# Patient Record
Sex: MALE | Race: WHITE | Employment: FULL TIME | URBAN - METROPOLITAN AREA
[De-identification: names, ages, dates, MRNs, and addresses within clinical notes are randomized per-mention and may not be internally consistent; named-entity substitution may affect disease eponyms.]

---

## 2020-11-06 ENCOUNTER — HOSPITAL ENCOUNTER (EMERGENCY)
Age: 45
Discharge: HOME | End: 2020-11-06
Attending: EMERGENCY MEDICINE

## 2020-11-06 VITALS
HEART RATE: 89 BPM | RESPIRATION RATE: 18 BRPM | HEIGHT: 61 IN | SYSTOLIC BLOOD PRESSURE: 170 MMHG | TEMPERATURE: 99 F | OXYGEN SATURATION: 99 % | BODY MASS INDEX: 46.29 KG/M2 | DIASTOLIC BLOOD PRESSURE: 116 MMHG

## 2020-11-06 LAB
BILIRUBIN URINE: ABNORMAL
BLOOD, URINE: NEGATIVE
CLARITY: CLEAR
COLOR: YELLOW
GLUCOSE URINE: NEGATIVE MG/DL
KETONES, URINE: 15 MG/DL
LEUKOCYTE ESTERASE, URINE: NEGATIVE
MICROSCOPIC EXAMINATION: ABNORMAL
NITRITE, URINE: NEGATIVE
PH UA: 6 (ref 5–8)
PROTEIN UA: NEGATIVE MG/DL
SPECIFIC GRAVITY UA: >=1.03 (ref 1–1.03)
URINE REFLEX TO CULTURE: ABNORMAL
URINE TYPE: ABNORMAL
UROBILINOGEN, URINE: 1 E.U./DL

## 2020-11-06 PROCEDURE — 87591 N.GONORRHOEAE DNA AMP PROB: CPT

## 2020-11-06 PROCEDURE — 99283 EMERGENCY DEPT VISIT LOW MDM: CPT

## 2020-11-06 PROCEDURE — 96372 THER/PROPH/DIAG INJ SC/IM: CPT

## 2020-11-06 PROCEDURE — 87491 CHLMYD TRACH DNA AMP PROBE: CPT

## 2020-11-06 PROCEDURE — 6360000002 HC RX W HCPCS: Performed by: EMERGENCY MEDICINE

## 2020-11-06 PROCEDURE — 6370000000 HC RX 637 (ALT 250 FOR IP): Performed by: EMERGENCY MEDICINE

## 2020-11-06 PROCEDURE — 81003 URINALYSIS AUTO W/O SCOPE: CPT

## 2020-11-06 RX ORDER — CEFTRIAXONE SODIUM 250 MG/1
250 INJECTION, POWDER, FOR SOLUTION INTRAMUSCULAR; INTRAVENOUS ONCE
Status: COMPLETED | OUTPATIENT
Start: 2020-11-06 | End: 2020-11-06

## 2020-11-06 RX ORDER — LEVOFLOXACIN 500 MG/1
500 TABLET, FILM COATED ORAL DAILY
Qty: 10 TABLET | Refills: 0 | Status: SHIPPED | OUTPATIENT
Start: 2020-11-06 | End: 2020-11-16

## 2020-11-06 RX ORDER — AZITHROMYCIN 500 MG/1
1000 TABLET, FILM COATED ORAL ONCE
Status: COMPLETED | OUTPATIENT
Start: 2020-11-06 | End: 2020-11-06

## 2020-11-06 RX ADMIN — CEFTRIAXONE SODIUM 250 MG: 250 INJECTION, POWDER, FOR SOLUTION INTRAMUSCULAR; INTRAVENOUS at 20:43

## 2020-11-06 RX ADMIN — AZITHROMYCIN 1000 MG: 500 TABLET, FILM COATED ORAL at 20:42

## 2020-11-06 ASSESSMENT — PAIN DESCRIPTION - LOCATION: LOCATION: PENIS

## 2020-11-06 ASSESSMENT — PAIN DESCRIPTION - ORIENTATION: ORIENTATION: LEFT

## 2020-11-06 ASSESSMENT — ENCOUNTER SYMPTOMS
PHOTOPHOBIA: 0
WHEEZING: 0
RHINORRHEA: 0
NAUSEA: 0
BACK PAIN: 0
SHORTNESS OF BREATH: 0
VOMITING: 0
COLOR CHANGE: 0

## 2020-11-06 ASSESSMENT — PAIN DESCRIPTION - ONSET: ONSET: ON-GOING

## 2020-11-06 ASSESSMENT — PAIN DESCRIPTION - FREQUENCY: FREQUENCY: CONTINUOUS

## 2020-11-06 ASSESSMENT — PAIN DESCRIPTION - PAIN TYPE: TYPE: ACUTE PAIN

## 2020-11-06 ASSESSMENT — PAIN SCALES - GENERAL: PAINLEVEL_OUTOF10: 6

## 2020-11-07 NOTE — ED NOTES
ED MD in to eval. This RN at bedside. Left testicle very tender to palpation. Some swelling noted.      Martínez Calzada RN  11/06/20 2037

## 2020-11-07 NOTE — ED PROVIDER NOTES
[address] [facility]  EMERGENCY DEPARTMENTENCOUNTER      Pt Name: Alaina Mcduffie  MRN: [id number]  [place] 1975  Date ofevaluation: 11/6/2020  Provider: Darnell Bray MD    CHIEF COMPLAINT       Chief Complaint   Patient presents with    Testicle Pain         HISTORY OF PRESENT ILLNESS   (Location/Symptom, Timing/Onset,Context/Setting, Quality, Duration, Modifying Factors, Severity)  Note limiting factors. Alaina Mcduffie is a 41 y.o. male  who  has a past medical history of Kidney stone. who presents to the emergency department for evaluation of left statistically pain. Patient reports an atraumatic acute onset of left testicular pain that began earlier in the day. He states that has been persistent throughout the course the day and is worse with movement and positioning. He states he did has tenderness to palpation to the posterior aspect and is concerned because he feels an area of swelling. He does describe some dysuria and penile discharge. He states he is not currently sexually active. He was in our medical history up with a partner and did not use barrier contraceptives, but this is been over a month in the past.  He denies abdominal pain fevers or vomiting. Denies changes in bowel function. He has not taken medications for symptoms. HPI    NursingNotes were reviewed. REVIEW OF SYSTEMS    (2-9 systems for level 4, 10 or more for level 5)     Review of Systems   Constitutional: Negative for activity change, chills, fatigue and fever. HENT: Negative for congestion and rhinorrhea. Eyes: Negative for photophobia and visual disturbance. Respiratory: Negative for shortness of breath and wheezing. Cardiovascular: Negative for palpitations and leg swelling. Gastrointestinal: Negative for nausea and vomiting. Endocrine: Negative for polydipsia and polyuria. Genitourinary: Positive for discharge, dysuria, scrotal swelling and testicular pain.  Negative for difficulty urinating, frequency, genital sores, hematuria and penile swelling. Musculoskeletal: Negative for back pain, gait problem, neck pain and neck stiffness. Skin: Negative for color change, pallor, rash and wound. Neurological: Negative for light-headedness and headaches. Psychiatric/Behavioral: Negative for confusion. The patient is not nervous/anxious. All other systems reviewed and are negative. Except as noted above the remainder of the review of systems was reviewed and negative. PAST MEDICAL HISTORY     Past Medical History:   Diagnosis Date    Kidney stone          SURGICALHISTORY       Past Surgical History:   Procedure Laterality Date    MANDIBLE FRACTURE SURGERY Left     WISDOM TOOTH EXTRACTION           CURRENT MEDICATIONS       Discharge Medication List as of 11/6/2020  8:51 PM      CONTINUE these medications which have NOT CHANGED    Details   ibuprofen (ADVIL) 200 MG tablet Take 2 tablets by mouth every 6 hours as needed for Pain for up to 8 doses. , Disp-24 tablet, R-0                  Patient has no known allergies. FAMILY HISTORY     History reviewed. No pertinent family history.        SOCIAL HISTORY       Social History     Socioeconomic History    Marital status: Single     Spouse name: None    Number of children: None    Years of education: None    Highest education level: None   Occupational History    None   Social Needs    Financial resource strain: None    Food insecurity     Worry: None     Inability: None    Transportation needs     Medical: None     Non-medical: None   Tobacco Use    Smoking status: Current Every Day Smoker     Packs/day: 1.00     Types: Cigarettes    Smokeless tobacco: Current User   Substance and Sexual Activity    Alcohol use: No     Alcohol/week: 0.0 standard drinks    Drug use: No     Types: IV     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    Sexual activity: Yes     Partners: Female   Lifestyle    Physical activity     Days per week: None     Minutes per session: None    Stress: None   Relationships    Social connections     Talks on phone: None     Gets together: None     Attends Caodaism service: None     Active member of club or organization: None     Attends meetings of clubs or organizations: None     Relationship status: None    Intimate partner violence     Fear of current or ex partner: None     Emotionally abused: None     Physically abused: None     Forced sexual activity: None   Other Topics Concern    None   Social History Narrative    None       SCREENINGS             PHYSICAL EXAM    (up to 7 for level 4, 8 or more for level 5)     ED Triage Vitals [11/06/20 2016]   BP Temp Temp Source Pulse Resp SpO2 Height Weight   (!) 170/116 99 °F (37.2 °C) Oral 89 18 99 % 5' 0.5\" (1.537 m) --       Physical Exam  Vitals signs and nursing note reviewed. Constitutional:       General: He is not in acute distress. Appearance: He is well-developed. HENT:      Head: Normocephalic and atraumatic. Eyes:      Conjunctiva/sclera: Conjunctivae normal.   Neck:      Musculoskeletal: Normal range of motion. Trachea: No tracheal deviation. Cardiovascular:      Rate and Rhythm: Normal rate and regular rhythm. Pulmonary:      Effort: Pulmonary effort is normal.      Breath sounds: Normal breath sounds. No wheezing or rales. Abdominal:      General: There is no distension. Palpations: Abdomen is soft. Tenderness: There is no abdominal tenderness. Hernia: There is no hernia in the left inguinal area or right inguinal area. Genitourinary:     Penis: Normal and circumcised. Scrotum/Testes: Cremasteric reflex is present. Left: Tenderness present. Testicular hydrocele or varicocele not present. Left testis is descended. Epididymis:      Left: Tenderness present. Musculoskeletal: Normal range of motion. General: No deformity.    Lymphadenopathy:      Lower Body: No right inguinal adenopathy. No left inguinal adenopathy. Skin:     General: Skin is warm and dry. Neurological:      Mental Status: He is alert and oriented to person, place, and time. RESULTS     EKG: All EKG's are interpreted by the Emergency Department Physician who either signs or Co-signsthis chart in the absence of a cardiologist.      RADIOLOGY:   Non-plain filmimages such as CT, Ultrasound and MRI are read by the radiologist. Plain radiographic images are visualized and preliminarily interpreted by the emergency physician with the below findings:        Interpretation per the Radiologist below, if available at the time ofthis note:    No orders to display         ED BEDSIDE ULTRASOUND:   Performed by ED Physician - none    LABS:  Labs Reviewed   URINE RT REFLEX TO CULTURE - Abnormal; Notable for the following components:       Result Value    Bilirubin Urine SMALL (*)     Ketones, Urine 15 (*)     All other components within normal limits    Narrative:     Performed at:  [address]   Phone [phone]   C.TRACHOMATIS N.GONORRHOEAE DNA, URINE       All other labs were within normal range or not returned as of this dictation. EMERGENCY DEPARTMENT COURSE and DIFFERENTIAL DIAGNOSIS/MDM:   Vitals:    Vitals:    11/06/20 2016   BP: (!) 170/116   Pulse: 89   Resp: 18   Temp: 99 °F (37.2 °C)   TempSrc: Oral   SpO2: 99%   Height: 5' 0.5\" (1.537 m)       Patient was given thefollowing medications:  Medications   azithromycin (ZITHROMAX) tablet 1,000 mg (1,000 mg Oral Given 11/6/20 2042)   cefTRIAXone (ROCEPHIN) injection 250 mg (250 mg Intramuscular Given 11/6/20 2043)       ED COURSE & MEDICAL DECISION MAKING    Pertinent Labs & Imaging studies reviewed. (See chart for details)   -  Patient seen and evaluated in the emergency department. -  Triage and nursing notes reviewed and incorporated.   -  Old chart records reviewed and incorporated. -  Differential diagnosis includes: Differential diagnosis: Testicular torsion, epididymitis, orchitis, epididymo-orchitis, prostatitis, scrotal abscess, scrotal cellulitis, hernia, kidney stone, STD, traumatic testicle/fracures testes, other    -  Work-up included:  See above  -  ED treatment included: See above  -  Results discussed with patient. Labs show unremarkable urinalysis. Patient has cremaster reflexes intact. Testicles not high riding. No improvement of the pain with elevation of the testicle but does have tenderness over the epididymis. I suspect the patient does have epididymitis given the presentation wants rule out testicular torsion. I did speak to the physician at [facility] [place] to inform of the patient would be sent for ultrasound. I was informed by nursing staff that the patient was delivering about having ultrasound performed this evening. He was provided with urology referral as well as a course of Levaquin and was treated empirically for STD. It was stressed to the patient that he does need to follow-up and should have an ultrasound to rule out testicular torsion. Patient feels improved on reevaluation. Symptomatic treatment with expectant management discussed with the patient and the amenable to treatment plan and outpatient follow-up. Strict return precautions were discussed with the patient. They demonstrated understanding of when to return to the emergency department for new or worsening symptoms. .  The patient is agreeable with plan of care and disposition. REASSESSMENT          CRITICAL CARE TIME   Total Critical Care time was 0 minutes, excluding separatelyreportable procedures. There was a high probability ofclinically significant/life threatening deterioration in the patient's condition which required my urgent intervention. CONSULTS:  None    PROCEDURES:  Unless otherwise noted below, none     Procedures    FINAL IMPRESSION      1.  Pain in left testicle          DISPOSITION/PLAN   DISPOSITION Decision To Discharge 11/06/2020 08:47:36 PM      PATIENT REFERREDTO:  [facility]) Pre-Services  [phone]  Schedule an appointment as soon as possible for a visit   As needed    Rafael Schultz MD  [address]  [phone]    Schedule an appointment as soon as possible for a visit   As needed      DISCHARGEMEDICATIONS:  Discharge Medication List as of 11/6/2020  8:51 PM      START taking these medications    Details   levoFLOXacin (LEVAQUIN) 500 MG tablet Take 1 tablet by mouth daily for 10 days, Disp-10 tablet,R-0Print                (Please note that portions of this note were completed with a voice recognition program.  Efforts were made to edit the dictations but occasionally words are mis-transcribed.)    Noe Vieira MD (electronically signed)  Attending Emergency Physician          Noe Vieira MD  11/06/20 9109

## 2020-11-07 NOTE — ED NOTES
Strongly encouraged to the patient that he needs to go to [facility] to be evaluated immediately for testicular torsion. Patient states that it has been a long week and he would rather just go home tonight and go to [facility] in the morning to get the ultrasound. MD made aware. Despite medical recommendations, patient states he is certain that he does not have a testicular torsion or an STD.      Oral Reid RN  11/06/20 8355

## 2020-11-07 NOTE — ED NOTES
States left sided testicle pain, especially when he does self-examinations for past 2 days. Looked up on internet and diagnosed self with epididymitis. States has been withdrawing from [employer]. Last dose was last week.      Conrado Murillo RN  11/06/20 2023

## 2020-11-08 ENCOUNTER — APPOINTMENT (OUTPATIENT)
Dept: ULTRASOUND IMAGING | Age: 45
End: 2020-11-08

## 2020-11-08 ENCOUNTER — HOSPITAL ENCOUNTER (EMERGENCY)
Age: 45
Discharge: HOME | End: 2020-11-08
Attending: EMERGENCY MEDICINE

## 2020-11-08 VITALS
DIASTOLIC BLOOD PRESSURE: 80 MMHG | OXYGEN SATURATION: 100 % | TEMPERATURE: 99.3 F | SYSTOLIC BLOOD PRESSURE: 136 MMHG | HEART RATE: 90 BPM | HEIGHT: 70 IN | BODY MASS INDEX: 33.71 KG/M2 | RESPIRATION RATE: 14 BRPM | WEIGHT: 235.45 LBS

## 2020-11-08 LAB
BILIRUBIN URINE: NEGATIVE
BLOOD, URINE: NEGATIVE
CLARITY: ABNORMAL
COLOR: YELLOW
COMMENT UA: ABNORMAL
GLUCOSE URINE: NEGATIVE MG/DL
KETONES, URINE: 40 MG/DL
LEUKOCYTE ESTERASE, URINE: NEGATIVE
MICROSCOPIC EXAMINATION: YES
MUCUS: ABNORMAL /LPF
NITRITE, URINE: NEGATIVE
PH UA: 5.5 (ref 5–8)
PROTEIN UA: NEGATIVE MG/DL
RBC UA: ABNORMAL /HPF (ref 0–4)
SPECIFIC GRAVITY UA: 1.01 (ref 1–1.03)
URINE REFLEX TO CULTURE: ABNORMAL
URINE TYPE: ABNORMAL
UROBILINOGEN, URINE: 0.2 E.U./DL
WBC UA: ABNORMAL /HPF (ref 0–5)

## 2020-11-08 PROCEDURE — 99283 EMERGENCY DEPT VISIT LOW MDM: CPT

## 2020-11-08 PROCEDURE — 76870 US EXAM SCROTUM: CPT

## 2020-11-08 PROCEDURE — 81001 URINALYSIS AUTO W/SCOPE: CPT

## 2020-11-08 ASSESSMENT — PAIN DESCRIPTION - DESCRIPTORS: DESCRIPTORS: ACHING

## 2020-11-08 ASSESSMENT — PAIN DESCRIPTION - LOCATION
LOCATION: OTHER (COMMENT)
LOCATION: GROIN

## 2020-11-08 ASSESSMENT — ENCOUNTER SYMPTOMS
SHORTNESS OF BREATH: 0
ABDOMINAL PAIN: 0

## 2020-11-08 ASSESSMENT — PAIN DESCRIPTION - ORIENTATION
ORIENTATION: LEFT
ORIENTATION: LEFT

## 2020-11-08 ASSESSMENT — PAIN DESCRIPTION - PAIN TYPE
TYPE: ACUTE PAIN
TYPE: ACUTE PAIN

## 2020-11-08 ASSESSMENT — PAIN DESCRIPTION - FREQUENCY: FREQUENCY: CONTINUOUS

## 2020-11-08 ASSESSMENT — PAIN SCALES - GENERAL
PAINLEVEL_OUTOF10: 4
PAINLEVEL_OUTOF10: 3

## 2020-11-08 ASSESSMENT — PAIN - FUNCTIONAL ASSESSMENT: PAIN_FUNCTIONAL_ASSESSMENT: ACTIVITIES ARE NOT PREVENTED

## 2020-11-08 ASSESSMENT — PAIN DESCRIPTION - ONSET: ONSET: ON-GOING

## 2020-11-08 ASSESSMENT — PAIN DESCRIPTION - PROGRESSION: CLINICAL_PROGRESSION: NOT CHANGED

## 2020-11-08 NOTE — ED PROVIDER NOTES
[address] [facility]      Pt Name: Dariel Sanders  MRN: [id number]  [place] 1975  Date of evaluation: 11/8/2020  Provider: Chloe Edward MD    CHIEF COMPLAINT       Chief Complaint   Patient presents with    Groin Swelling     seen friday for lump on testicle. left. need US. HISTORY OF PRESENT ILLNESS   (Location/Symptom, Timing/Onset, Context/Setting, Quality, Duration, Modifying Factors, Severity)  Note limiting factors. Dariel Sanders is a 41 y.o. male who presents to the emergency department with left testicle lump    HPI     Is a 23-year-old  gentleman who was seen a couple days ago for a lump on his left testicle. He was treated for presumptive STD and was actually to be transferred for an ultrasound but he declined it at that time. Currently he presents with actually improving symptoms but still a significant amount of pain which he describes as dull achy localized left testicle minimally worse with movement some relieved by rest with no other aggravating leaving factors. He did have some penile discharge which is resolved at this time. Nursing Notes were reviewed. REVIEW OF SYSTEMS    (2-9 systems for level 4, 10 or more for level 5)     Review of Systems   Constitutional: Negative for chills and fever. Respiratory: Negative for shortness of breath. Cardiovascular: Negative for chest pain. Gastrointestinal: Negative for abdominal pain. Genitourinary: Positive for testicular pain. Negative for penile swelling and scrotal swelling. All other systems reviewed and are negative. Except as noted above the remainder of the review of systems was reviewed and negative.        PAST MEDICAL HISTORY     Past Medical History:   Diagnosis Date    Hypertension     Kidney stone          SURGICAL HISTORY       Past Surgical History:   Procedure Laterality Date    MANDIBLE FRACTURE SURGERY Left     WISDOM TOOTH radiologist. [place] radiographic images are visualized and preliminarily interpreted by the emergency physician with the below findings:        Interpretation per the Radiologist below, if available at the time of this note:    [address]   Final Result   Unremarkable testicular ultrasound with normal Doppler flow. ED BEDSIDE ULTRASOUND:   Performed by ED Physician - none    LABS:  Labs Reviewed   URINE RT REFLEX TO CULTURE - Abnormal; Notable for the following components:       Result Value    Clarity, UA CLOUDY (*)     Ketones, Urine 40 (*)     All other components within normal limits    Narrative:     Performed at:  [address] [employer] [address]   Phone [phone]   MICROSCOPIC URINALYSIS - Abnormal; Notable for the following components:    Mucus, UA Rare (*)     All other components within normal limits    Narrative:     Performed at:  [address] [employer] [address]   Phone [phone]       All other labs were within normal range or not returned as of this dictation. EMERGENCY DEPARTMENT COURSE and DIFFERENTIAL DIAGNOSIS/MDM:   Vitals:    Vitals:    11/08/20 1540 11/08/20 1659   BP: (!) 156/80 (!) 159/86   Pulse: 108 102   Resp: 18 16   Temp: 98.3 °F (36.8 °C) 99.3 °F (37.4 °C)   TempSrc: Temporal Oral   SpO2: 97% 100%   Weight: 235 lb 7.2 oz (106.8 kg)    Height: 5' 10\" (1.778 m)        Above history and physical exam were performed. I reviewed his past medical past surgical social family history. MDM      This is a 55-year-old  gentleman with history of recent STD for which he is being treated and is symptomatically better but presents essentially to get a follow-up ultrasound that he was supposed to get given he still has some testicular pain.   I considered the diagnosis of intermittent torsion however his current scrotal ultrasound is unremarkable. At this point time I believe he is stable for discharge and has been treated for STD. He has urology follow-up in outpatient basis. REASSESSMENT          CRITICAL CARE TIME       CONSULTS:  None    PROCEDURES:  Unless otherwise noted below, none     Procedures        FINAL IMPRESSION      1. Testicular pain, left          DISPOSITION/PLAN   DISPOSITION Decision To Discharge 11/08/2020 06:46:23 PM      PATIENT REFERRED TO:  No follow-up provider specified. DISCHARGE MEDICATIONS:  New Prescriptions    No medications on file     Controlled Substances Monitoring:     No flowsheet data found.     (Please note that portions of this note were completed with a voice recognition program.  Efforts were made to edit the dictations but occasionally words are mis-transcribed.)    Antonio Marcus MD (electronically signed)  Attending Emergency Physician         Antonio Marcus MD  11/08/20 5381

## 2020-11-08 NOTE — ED NOTES
Pt. Called requesting work note, he did not go to [facility] on Friday or Saturday for Ultrasound. Informed pt. We could not get him a work note.      Conrado Marinelli RN  11/08/20 5338

## 2020-11-09 LAB
C. TRACHOMATIS DNA ,URINE: NEGATIVE
N. GONORRHOEAE DNA, URINE: NEGATIVE

## 2020-11-19 ENCOUNTER — HOSPITAL ENCOUNTER (EMERGENCY)
Age: 45
Discharge: HOME | End: 2020-11-19
Attending: EMERGENCY MEDICINE
Payer: COMMERCIAL

## 2020-11-19 VITALS
BODY MASS INDEX: 35.27 KG/M2 | DIASTOLIC BLOOD PRESSURE: 111 MMHG | OXYGEN SATURATION: 99 % | SYSTOLIC BLOOD PRESSURE: 171 MMHG | WEIGHT: 245.81 LBS | TEMPERATURE: 98.5 F | HEART RATE: 98 BPM | RESPIRATION RATE: 16 BRPM

## 2020-11-19 PROCEDURE — 6360000002 HC RX W HCPCS: Performed by: EMERGENCY MEDICINE

## 2020-11-19 PROCEDURE — 12001 RPR S/N/AX/GEN/TRNK 2.5CM/<: CPT

## 2020-11-19 PROCEDURE — 12002 RPR S/N/AX/GEN/TRNK2.6-7.5CM: CPT

## 2020-11-19 PROCEDURE — 99283 EMERGENCY DEPT VISIT LOW MDM: CPT

## 2020-11-19 PROCEDURE — 90471 IMMUNIZATION ADMIN: CPT | Performed by: EMERGENCY MEDICINE

## 2020-11-19 PROCEDURE — 90715 TDAP VACCINE 7 YRS/> IM: CPT | Performed by: EMERGENCY MEDICINE

## 2020-11-19 RX ADMIN — TETANUS TOXOID, REDUCED DIPHTHERIA TOXOID AND ACELLULAR PERTUSSIS VACCINE, ADSORBED 0.5 ML: 5; 2.5; 8; 8; 2.5 SUSPENSION INTRAMUSCULAR at 20:34

## 2020-11-19 ASSESSMENT — PAIN DESCRIPTION - PAIN TYPE: TYPE: ACUTE PAIN

## 2020-11-19 ASSESSMENT — PAIN DESCRIPTION - LOCATION: LOCATION: HEAD

## 2020-11-19 ASSESSMENT — PAIN SCALES - GENERAL: PAINLEVEL_OUTOF10: 4

## 2020-11-20 NOTE — ED NOTES
Wound intact with sutures, cleaned with NSS. Polysporin, dry, sterile dressing applied. Pt tolerated well.       Jeff Pearson RN  11/19/20 2030

## 2020-11-20 NOTE — ED TRIAGE NOTES
Pt to ED with complaint of laceration to top right side of head. States raised up at work and hit his head on a trailer. Denies LOC. Bandaid removed revealing jagged laceration approximately 4 cm in length, bleeding controlled. Wound cleaned with Hibiclins/ NSS. Kitty Ou Pt tolerated without complaint.

## 2020-11-20 NOTE — ED NOTES
Pt states he has had high blood pressure \"for years\", states he has been told to follow up with PMD for his blood pressure.       Stephanie Pedro RN  11/19/20 2040

## 2020-11-20 NOTE — ED PROVIDER NOTES
CHIEF COMPLAINT  Chief Complaint   Patient presents with    Laceration     to top of head, hit head on trailer around [address] [facility] Ramy Judd is a 41 y.o. male who presents to the ED complaining of laceration to the right parietal side of his scalp when he hit his head on the top of the trailer approximately 3 hours ago. Tetanus status is unknown. No loss of consciousness. No neck pain. No visual changes. No epistaxis. No focal neurologic deficits or paresthesias. No amnesia. No vomiting. No vertigo or ataxia. No other complaints, modifying factors or associated symptoms. Nursing notes reviewed. Past Medical History:   Diagnosis Date    Heroin use     Hypertension     Kidney stone      Past Surgical History:   Procedure Laterality Date    MANDIBLE FRACTURE SURGERY Left     WISDOM TOOTH EXTRACTION       History reviewed. No pertinent family history.   Social History     Socioeconomic History    Marital status: Single     Spouse name: Not on file    Number of children: Not on file    Years of education: Not on file    Highest education level: Not on file   Occupational History    Not on file   Social Needs    Financial resource strain: Not on file    Food insecurity     Worry: Not on file     Inability: Not on file    Transportation needs     Medical: Not on file     Non-medical: Not on file   Tobacco Use    Smoking status: Current Every Day Smoker     Packs/day: 1.00     Types: Cigarettes    Smokeless tobacco: Current User   Substance and Sexual Activity    Alcohol use: No     Alcohol/week: 0.0 standard drinks    Drug use: Yes     Types: Marijuana     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    Sexual activity: Yes     Partners: Female   Lifestyle    Physical activity     Days per week: Not on file     Minutes per session: Not on file    Stress: Not on file   Relationships    Social connections     Talks on phone: Not on file     Gets together: Not on file     Attends Muslim service: Not on file     Active member of club or organization: Not on file     Attends meetings of clubs or organizations: Not on file     Relationship status: Not on file    Intimate partner violence     Fear of current or ex partner: Not on file     Emotionally abused: Not on file     Physically abused: Not on file     Forced sexual activity: Not on file   Other Topics Concern    Not on file   Social History Narrative    Not on file     Current Facility-Administered Medications   Medication Dose Route Frequency Provider Last Rate Last Dose    Tetanus-Diphth-Acell Pertussis (BOOSTRIX) injection 0.5 mL  0.5 mL Intramuscular Once Adriano Pleitez MD         Current Outpatient Medications   Medication Sig Dispense Refill    Buprenorphine HCl-Naloxone HCl (SUBOXONE SL) Place under the tongue      ibuprofen (ADVIL) 200 MG tablet Take 2 tablets by mouth every 6 hours as needed for Pain for up to 8 doses. 24 tablet 0     No Known Allergies    REVIEW OF SYSTEMS  Positives and pertinent negatives as per HPI. Six other systems were reviewed and are negative. Nursing notes pertaining to ROS were reviewed. PHYSICAL EXAM   BP (!) 171/111   Pulse 98   Temp 98.5 °F (36.9 °C) (Oral)   Resp 16   Wt 245 lb 13 oz (111.5 kg)   SpO2 99%   BMI 35.27 kg/m²   General: Alert and oriented x 3, NAD. No increased work of breathing or accessory muscle use. Non-ill appearing. Appropriate and interactive  Eyes: PERRL, no scleral icterus, injection or exudate. EOMI. HENT: 4 cm irregular flap-like laceration to the right lateral parietal scalp without underlying hematoma, bony depression or step-off. No foreign bodies palpated or visualized. The wound edges are not devitalized. Oral pharynx is clear, moist, no enanthem. No tonsillar hypertropy or exudate. Nasal mucous membranes are clear. TM's are clear without evidence of otitis media. No hemotympanum.   No Sahni's or raccoon sign. No epistaxis or trismus. Neck:  No Lymphadenopathy. Non-tender to palpation. Normal ROM. No JVD. No thyromegaly. No Mass. PULMONARY: Lungs clear bilaterally without wheezes, rales or rhonchi. Good air movement bilaterally. CV: Regular rate and rhythm without murmurs, rubs or gallops. ABD: Soft, non-tender, non-distended, normal bowel sounds, no hepatosplenomegaly, no masses. No peritoneal signs, rebound or guarding. Back:  No CVAT, no rash. EXT: No cyanosis or clubbing. No rash. CR < 2 seconds. No tenderness to palpation. No lower extremity edema. +2 pulses in upper/lower extremities bilaterally. Skin is warm and dry. PSYCH: normal affect  NEURO: Alert and oriented x 3, NAD. Interactive. GCS 15.  CN 2-12 are intact. PERRL. EOMI. Visual fields are normal.  Fundi are sharp without papilledema. 5 of 5 LE strength that is bilaterally symmetric.  5 of 5 UE strength that is bilaterally symmetric. Normal sensation to light touch of the UE and LE.  2/4 DTR of the biceps, patellar and Achilles tendons. No clonus. Normal Romberg without pronator drift. Normal finger to nose testing without past pointing. No ataxia or truncal instability. Lac Repair    Date/Time: 11/19/2020 8:05 PM  Performed by: Mahad Cole MD  Authorized by: Mahad Cole MD     Consent:     Consent obtained:  Verbal    Consent given by:  Patient    Risks discussed:  Infection, need for additional repair, poor cosmetic result, pain and poor wound healing    Alternatives discussed:  Observation  Anesthesia (see MAR for exact dosages):      Anesthesia method:  Local infiltration    Local anesthetic:  Lidocaine 1% w/o epi  Laceration details:     Location:  Scalp    Scalp location:  R parietal    Length (cm):  5    Depth (mm):  4  Repair type:     Repair type:  Simple  Pre-procedure details:     Preparation:  Patient was prepped and draped in usual sterile fashion  Exploration: Hemostasis achieved with:  Direct pressure    Wound exploration: entire depth of wound probed and visualized      Contaminated: no    Treatment:     Area cleansed with:  Betadine    Amount of cleaning:  Standard    Irrigation solution:  Sterile water    Irrigation volume:  250    Irrigation method:  Syringe  Skin repair:     Repair method:  Sutures    Suture size:  3-0    Suture material:  Nylon    Suture technique:  Simple interrupted    Number of sutures:  7  Approximation:     Approximation:  Close  Post-procedure details:     Dressing:  Open (no dressing)    Patient tolerance of procedure: Tolerated well, no immediate complications          ED COURSE/MDM  Scalp laceration: Primarily closed in the emergency room. Sutures out in 7 to 10 days. No evidence of foreign body or devitalized tissue. No evidence of concussion. Tetanus status was up dated. Wound precautions were given. Hypertension:  Patient was hypertensive during the ER visit today. There are no signs of hypertensive emergency or evidence of end organ damage by history or physical exam.  These findings were discussed with the patient and advised to follow up with primary care physician to further assess and treat hypertension in the outpatient setting. The patient's blood pressure was found to be elevated according to CMS/Medicare and the Affordable Care Act/ObamaCare criteria. Elevated blood pressure could occur because of pain or anxiety or other reasons and does not mean that they need to have their blood pressure treated or medications otherwise adjusted. However, this could also be a sign that they will need to have their blood pressure treated or medications changed. The patient was instructed to take a list of recent blood pressure readings to their next visit with their personal physician. Patient was given scripts for the following medications. I counseled patient how to take these medications.    New Prescriptions    No medications on file         CLINICAL IMPRESSION  1. Laceration of scalp, initial encounter        Blood pressure (!) 171/111, pulse 98, temperature 98.5 °F (36.9 °C), temperature source Oral, resp. rate 16, weight 245 lb 13 oz (111.5 kg), SpO2 99 %.       Follow-up with:  [address] [place] [address]             Irvin Buckner MD  11/19/20 2006

## 2020-11-28 ENCOUNTER — HOSPITAL ENCOUNTER (EMERGENCY)
Age: 45
Discharge: HOME | End: 2020-11-28
Attending: EMERGENCY MEDICINE
Payer: COMMERCIAL

## 2020-11-28 VITALS
WEIGHT: 233.91 LBS | SYSTOLIC BLOOD PRESSURE: 157 MMHG | BODY MASS INDEX: 33.49 KG/M2 | TEMPERATURE: 97.8 F | DIASTOLIC BLOOD PRESSURE: 99 MMHG | OXYGEN SATURATION: 99 % | HEART RATE: 86 BPM | RESPIRATION RATE: 15 BRPM | HEIGHT: 70 IN

## 2020-11-28 PROCEDURE — 99283 EMERGENCY DEPT VISIT LOW MDM: CPT

## 2020-11-28 RX ORDER — AMLODIPINE BESYLATE 5 MG/1
5 TABLET ORAL DAILY
Qty: 30 TABLET | Refills: 0 | Status: SHIPPED | OUTPATIENT
Start: 2020-11-28 | End: 2022-07-04

## 2020-11-28 NOTE — ED NOTES
Pt here to have 6 sutures removed from right side of head. Pt had them placed 9 days ago. Pt also concerned about his blood pressure which he say has been running high. MD discussed starting pt on some BP medication and having him follow up with his PCP.      [address] QUIN Tinajero  11/28/20 [address] [place] QUIN [place]  11/28/20 7183

## 2020-11-29 NOTE — ED PROVIDER NOTES
EMERGENCY DEPARTMENT PROVIDER NOTE    Patient Identification  Pt Name: Khadijah Bess  MRN: [id number]  [place] 1975  Date of evaluation: 11/28/2020  Provider: Daniella Parker DO  PCP: No primary care provider on file. Chief Complaint  Suture / Staple Removal (sutures to be removed from head)      HPI  (History provided by patient)  This is a 41 y.o. male who was brought in by self for suture removal, patient with scalp laceration with sutures placed 9 days ago. Also reports his blood pressure has been running high and is concerned. He denies any symptoms from this. ROS    Const:  No fevers, no chills, no generalized weakness  Skin:  No rash, no lesions  Eyes:  No visual changes, no blurry or double vision  Card:  No chest pain, no palpitations  Resp:  No shortness of breath, no cough  Abd:  No abdominal pain, no nausea  MSK:  No joint pain, no myalgia  Neuro:  No focal weakness, no headache, no paresthesia    All other systems reviewed and negative unless otherwise noted in HPI        I have reviewed the following nursing documentation:  Allergies: Patient has no known allergies. Past medical history:   Past Medical History:   Diagnosis Date    Heroin use     Hypertension     Kidney stone      Past surgical history:   Past Surgical History:   Procedure Laterality Date    MANDIBLE FRACTURE SURGERY Left     WISDOM TOOTH EXTRACTION         Home medications:   Discharge Medication List as of 11/28/2020  6:20 PM      CONTINUE these medications which have NOT CHANGED    Details   Buprenorphine HCl-Naloxone HCl (SUBOXONE SL) Place under the tongueHistorical Med      ibuprofen (ADVIL) 200 MG tablet Take 2 tablets by mouth every 6 hours as needed for Pain for up to 8 doses. , Disp-24 tablet, R-0             Social history:  reports that he has been smoking cigarettes. He has been smoking about 1.00 pack per day. He uses smokeless tobacco. He reports current drug use. Drug: Marijuana.  He reports that he does not drink alcohol. Family history:  History reviewed. No pertinent family history. Exam  ED Triage Vitals [11/28/20 1808]   BP Temp Temp Source Pulse Resp SpO2 Height Weight   (!) 157/99 97.8 °F (36.6 °C) Oral 86 15 99 % 5' 10\" (1.778 m) 233 lb 14.5 oz (106.1 kg)     Nursing note and vitals reviewed. Constitutional: Well developed, well nourished. Non-toxic in appearance. HENT:      Head: Normocephalic and atraumatic. Ears: External ears normal.      Nose: Nose normal.  Cardiovascular: RRR; no murmurs, rubs, or gallops. Pulmonary/Chest: Effort normal. No respiratory distress. CTAB. No stridor. No wheezes. No rales. Musculoskeletal: Moves all extremities. No gross deformity. Neurological: Alert and oriented. Face symmetric. Speech is clear. Skin: Warm and dry. No rash. Right parietoccipital stellate laceration repaired with sutures,  appears well approximated and well healed, no surrounding erythema        Radiology  No orders to display       Labs  No results found for this visit on 11/28/20. Screenings           MDM and ED Course    Patient afebrile and nontoxic. Six sutures removed from scalp laceration without complication, wound well healed, no evidence of infection. Patient with elevated BP, on record review has had consistently elevated blood pressures over the past 2 years. Asymptomatic. Patient requesting medication for blood pressure and will start on amlodipine. Counseled importance of PCP follow up and will refer, patient verbalized understanding. Safe for discharge to self care. Return precautions discussed. Final Impression  1. Encounter for removal of sutures    2. Essential hypertension        Blood pressure (!) 157/99, pulse 86, temperature 97.8 °F (36.6 °C), temperature source Oral, resp. rate 15, height 5' 10\" (1.778 m), weight 233 lb 14.5 oz (106.1 kg), SpO2 99 %.      Disposition:  DISPOSITION Decision To Discharge 11/28/2020 06:18:46 PM      Patient Referrals:  [facility] ([facility]) Pre-Services  [phone]          Discharge Medications:  Discharge Medication List as of 11/28/2020  6:20 PM      START taking these medications    Details   amLODIPine (NORVASC) 5 MG tablet Take 1 tablet by mouth daily, Disp-30 tablet,R-0Print             Discontinued Medications:  Discharge Medication List as of 11/28/2020  6:20 PM          This chart was generated using the [employer] dictation system. I created this record but it may contain dictation errors given the limitations of this technology.     Leopoldo Webster DO (electronically signed)  Attending Emergency Physician       Leopoldo Webster DO  11/28/20 3008 Statement Selected

## 2021-01-02 ENCOUNTER — HOSPITAL ENCOUNTER (EMERGENCY)
Age: 46
Discharge: HOME | End: 2021-01-02
Payer: COMMERCIAL

## 2021-01-02 ENCOUNTER — APPOINTMENT (OUTPATIENT)
Dept: GENERAL RADIOLOGY | Age: 46
End: 2021-01-02
Payer: COMMERCIAL

## 2021-01-02 VITALS
HEART RATE: 65 BPM | SYSTOLIC BLOOD PRESSURE: 139 MMHG | WEIGHT: 247.14 LBS | DIASTOLIC BLOOD PRESSURE: 82 MMHG | BODY MASS INDEX: 35.38 KG/M2 | TEMPERATURE: 97.1 F | HEIGHT: 70 IN | OXYGEN SATURATION: 98 % | RESPIRATION RATE: 17 BRPM

## 2021-01-02 DIAGNOSIS — Z20.822 SUSPECTED COVID-19 VIRUS INFECTION: Primary | ICD-10-CM

## 2021-01-02 LAB
A/G RATIO: 1.6 (ref 1.1–2.2)
ALBUMIN SERPL-MCNC: 4.2 G/DL (ref 3.4–5)
ALP BLD-CCNC: 66 U/L (ref 40–129)
ALT SERPL-CCNC: 133 U/L (ref 10–40)
ANION GAP SERPL CALCULATED.3IONS-SCNC: 10 MMOL/L (ref 3–16)
AST SERPL-CCNC: 64 U/L (ref 15–37)
BASOPHILS ABSOLUTE: 0.1 K/UL (ref 0–0.2)
BASOPHILS RELATIVE PERCENT: 1 %
BILIRUB SERPL-MCNC: <0.2 MG/DL (ref 0–1)
BUN BLDV-MCNC: 17 MG/DL (ref 7–20)
CALCIUM SERPL-MCNC: 9.2 MG/DL (ref 8.3–10.6)
CHLORIDE BLD-SCNC: 104 MMOL/L (ref 99–110)
CO2: 26 MMOL/L (ref 21–32)
CREAT SERPL-MCNC: 0.8 MG/DL (ref 0.9–1.3)
EOSINOPHILS ABSOLUTE: 0.3 K/UL (ref 0–0.6)
EOSINOPHILS RELATIVE PERCENT: 2.7 %
GFR AFRICAN AMERICAN: >60
GFR NON-AFRICAN AMERICAN: >60
GLOBULIN: 2.7 G/DL
GLUCOSE BLD-MCNC: 86 MG/DL (ref 70–99)
HCT VFR BLD CALC: 46.2 % (ref 40.5–52.5)
HEMOGLOBIN: 15.3 G/DL (ref 13.5–17.5)
LIPASE: 40 U/L (ref 13–60)
LYMPHOCYTES ABSOLUTE: 2 K/UL (ref 1–5.1)
LYMPHOCYTES RELATIVE PERCENT: 15.3 %
MCH RBC QN AUTO: 30.1 PG (ref 26–34)
MCHC RBC AUTO-ENTMCNC: 33.2 G/DL (ref 31–36)
MCV RBC AUTO: 90.7 FL (ref 80–100)
MONOCYTES ABSOLUTE: 1 K/UL (ref 0–1.3)
MONOCYTES RELATIVE PERCENT: 7.8 %
NEUTROPHILS ABSOLUTE: 9.4 K/UL (ref 1.7–7.7)
NEUTROPHILS RELATIVE PERCENT: 73.2 %
PDW BLD-RTO: 13.7 % (ref 12.4–15.4)
PLATELET # BLD: 208 K/UL (ref 135–450)
PMV BLD AUTO: 9.3 FL (ref 5–10.5)
POTASSIUM REFLEX MAGNESIUM: 4.3 MMOL/L (ref 3.5–5.1)
PRO-BNP: 15 PG/ML (ref 0–124)
RAPID INFLUENZA  B AGN: NEGATIVE
RAPID INFLUENZA A AGN: NEGATIVE
RBC # BLD: 5.09 M/UL (ref 4.2–5.9)
SODIUM BLD-SCNC: 140 MMOL/L (ref 136–145)
TOTAL PROTEIN: 6.9 G/DL (ref 6.4–8.2)
TROPONIN: <0.01 NG/ML
WBC # BLD: 12.8 K/UL (ref 4–11)

## 2021-01-02 PROCEDURE — 83880 ASSAY OF NATRIURETIC PEPTIDE: CPT

## 2021-01-02 PROCEDURE — 6360000002 HC RX W HCPCS: Performed by: PHYSICIAN ASSISTANT

## 2021-01-02 PROCEDURE — 93005 ELECTROCARDIOGRAM TRACING: CPT | Performed by: PHYSICIAN ASSISTANT

## 2021-01-02 PROCEDURE — U0003 INFECTIOUS AGENT DETECTION BY NUCLEIC ACID (DNA OR RNA); SEVERE ACUTE RESPIRATORY SYNDROME CORONAVIRUS 2 (SARS-COV-2) (CORONAVIRUS DISEASE [COVID-19]), AMPLIFIED PROBE TECHNIQUE, MAKING USE OF HIGH THROUGHPUT TECHNOLOGIES AS DESCRIBED BY CMS-2020-01-R: HCPCS

## 2021-01-02 PROCEDURE — 83690 ASSAY OF LIPASE: CPT

## 2021-01-02 PROCEDURE — 84484 ASSAY OF TROPONIN QUANT: CPT

## 2021-01-02 PROCEDURE — 80053 COMPREHEN METABOLIC PANEL: CPT

## 2021-01-02 PROCEDURE — 2580000003 HC RX 258: Performed by: PHYSICIAN ASSISTANT

## 2021-01-02 PROCEDURE — 96374 THER/PROPH/DIAG INJ IV PUSH: CPT

## 2021-01-02 PROCEDURE — 71045 X-RAY EXAM CHEST 1 VIEW: CPT

## 2021-01-02 PROCEDURE — 99284 EMERGENCY DEPT VISIT MOD MDM: CPT

## 2021-01-02 PROCEDURE — 6370000000 HC RX 637 (ALT 250 FOR IP): Performed by: PHYSICIAN ASSISTANT

## 2021-01-02 PROCEDURE — 2500000003 HC RX 250 WO HCPCS: Performed by: PHYSICIAN ASSISTANT

## 2021-01-02 PROCEDURE — 87804 INFLUENZA ASSAY W/OPTIC: CPT

## 2021-01-02 PROCEDURE — 96375 TX/PRO/DX INJ NEW DRUG ADDON: CPT

## 2021-01-02 PROCEDURE — 85025 COMPLETE CBC W/AUTO DIFF WBC: CPT

## 2021-01-02 RX ORDER — BENZONATATE 100 MG/1
100 CAPSULE ORAL 3 TIMES DAILY PRN
Qty: 30 CAPSULE | Refills: 0 | Status: SHIPPED | OUTPATIENT
Start: 2021-01-02 | End: 2021-01-09

## 2021-01-02 RX ORDER — FAMOTIDINE 20 MG/1
20 TABLET, FILM COATED ORAL 2 TIMES DAILY
Qty: 60 TABLET | Refills: 0 | Status: SHIPPED | OUTPATIENT
Start: 2021-01-02 | End: 2021-02-09

## 2021-01-02 RX ORDER — ONDANSETRON 4 MG/1
4 TABLET, ORALLY DISINTEGRATING ORAL EVERY 8 HOURS PRN
Qty: 20 TABLET | Refills: 0 | Status: SHIPPED | OUTPATIENT
Start: 2021-01-02 | End: 2021-02-09

## 2021-01-02 RX ORDER — 0.9 % SODIUM CHLORIDE 0.9 %
1000 INTRAVENOUS SOLUTION INTRAVENOUS ONCE
Status: COMPLETED | OUTPATIENT
Start: 2021-01-02 | End: 2021-01-02

## 2021-01-02 RX ORDER — DICYCLOMINE HYDROCHLORIDE 10 MG/1
10 CAPSULE ORAL ONCE
Status: COMPLETED | OUTPATIENT
Start: 2021-01-02 | End: 2021-01-02

## 2021-01-02 RX ORDER — DICYCLOMINE HYDROCHLORIDE 10 MG/1
10 CAPSULE ORAL EVERY 6 HOURS PRN
Qty: 20 CAPSULE | Refills: 0 | Status: SHIPPED | OUTPATIENT
Start: 2021-01-02 | End: 2021-02-09

## 2021-01-02 RX ORDER — ONDANSETRON 2 MG/ML
4 INJECTION INTRAMUSCULAR; INTRAVENOUS ONCE
Status: COMPLETED | OUTPATIENT
Start: 2021-01-02 | End: 2021-01-02

## 2021-01-02 RX ADMIN — SODIUM CHLORIDE 1000 ML: 9 INJECTION, SOLUTION INTRAVENOUS at 21:41

## 2021-01-02 RX ADMIN — ONDANSETRON 4 MG: 2 INJECTION INTRAMUSCULAR; INTRAVENOUS at 21:41

## 2021-01-02 RX ADMIN — DICYCLOMINE HYDROCHLORIDE 10 MG: 10 CAPSULE ORAL at 21:41

## 2021-01-02 RX ADMIN — FAMOTIDINE 20 MG: 10 INJECTION, SOLUTION INTRAVENOUS at 21:41

## 2021-01-02 ASSESSMENT — ENCOUNTER SYMPTOMS
COUGH: 1
CHEST TIGHTNESS: 1
DIARRHEA: 0
EYES NEGATIVE: 1
NAUSEA: 1
VOMITING: 1
ABDOMINAL PAIN: 1

## 2021-01-03 LAB — SARS-COV-2: NOT DETECTED

## 2021-01-03 NOTE — ED PROVIDER NOTES
Patient seen and evaluated independently by CHILO. I was available for consultation as needed. This note is provided for EKG interpretation only.     EKG was reviewed by emergency department physician in the absence of a cardiologist    Narrow complex sinus rhythm, rate 63, normal axis, normal AZ and QRS intervals, normal Qtc, no ST elevations or depressions, normal t-wave morphology, impression NSR, no STEMI       Zachary Maxwell DO  01/02/21 0949

## 2021-01-03 NOTE — ED PROVIDER NOTES
change and chills. Negative for fever. HENT: Positive for congestion. Eyes: Negative. Respiratory: Positive for cough and chest tightness. Cardiovascular: Negative for palpitations and leg swelling. Gastrointestinal: Positive for abdominal pain, nausea and vomiting. Negative for diarrhea. Genitourinary: Negative. Musculoskeletal: Positive for arthralgias and myalgias. Skin: Negative. Neurological: Positive for headaches. Negative for dizziness and light-headedness. Psychiatric/Behavioral: Negative for behavioral problems and confusion. Except as noted above the remainder of the review of systems was reviewed and negative. PAST MEDICAL HISTORY         Diagnosis Date    Heroin use     Hypertension     Kidney stone        SURGICAL HISTORY           Procedure Laterality Date    MANDIBLE FRACTURE SURGERY Left     WISDOM TOOTH EXTRACTION         CURRENT MEDICATIONS     [unfilled]    ALLERGIES     Patient has no known allergies. FAMILY HISTORY     History reviewed. No pertinent family history. No family status information on file. SOCIAL HISTORY      reports that he has been smoking cigarettes. He has been smoking about 1.00 pack per day. He uses smokeless tobacco. He reports current drug use. Drug: Marijuana. He reports that he does not drink alcohol. PHYSICAL EXAM    (up to 7 for level 4, 8 or more for level 5)     ED Triage Vitals   Enc Vitals Group      BP 01/02/21 1956 (!) 148/98      Pulse 01/02/21 1956 66      Resp 01/02/21 1956 16      Temp 01/02/21 1956 97.1 °F (36.2 °C)      Temp Source 01/02/21 1956 Oral      SpO2 01/02/21 1956 98 %      Weight 01/02/21 1959 247 lb 2.2 oz (112.1 kg)      Height 01/02/21 1956 5' 10\" (1.778 m)      Head Circumference --       Peak Flow --       Pain Score --       Pain Loc --       Pain Edu? --       Excl. in [address]? --         Physical Exam  Vitals signs reviewed. Constitutional:       Appearance: Normal appearance.    HENT: Head: Normocephalic and atraumatic. Neck:      Musculoskeletal: Normal range of motion and neck supple. Cardiovascular:      Rate and Rhythm: Normal rate and regular rhythm. Pulmonary:      Effort: Pulmonary effort is normal. No respiratory distress. Breath sounds: Normal breath sounds. Abdominal:      Palpations: Abdomen is soft. Tenderness: There is abdominal tenderness (Mild epigastric). Musculoskeletal: Normal range of motion. Skin:     General: Skin is warm. Neurological:      General: No focal deficit present. Mental Status: He is alert and oriented to person, place, and time. Psychiatric:         Mood and Affect: Mood normal.         Behavior: Behavior normal.           DIAGNOSTIC RESULTS     EKG: All EKG's are interpreted by the Emergency Department Physician who either signs or Co-signs this chart in the absence of a cardiologist.    RADIOLOGY:   Non-plain film images such as CT, Ultrasound and MRI are read by the radiologist. Plain radiographic images are visualized and preliminarilyinterpreted by the emergency physician with the below findings:    Interpretation per the Radiologist below,if available at the time of this note:    XR CHEST PORTABLE   Final Result   No acute cardiopulmonary abnormality.                LABS:  Labs Reviewed   CBC WITH AUTO DIFFERENTIAL - Abnormal; Notable for the following components:       Result Value    WBC 12.8 (*)     Neutrophils Absolute 9.4 (*)     All other components within normal limits    Narrative:     Performed at:  [facility]  [address] [place], [place] [address]   Phone [phone]   COMPREHENSIVE METABOLIC PANEL W/ REFLEX TO MG FOR LOW K - Abnormal; Notable for the following components:    CREATININE 0.8 (*)      (*)     AST 64 (*)     All other components within normal limits    Narrative:     Performed at:  [facility]  [address] [facility] [place] [address]   Phone [phone]   RAPID INFLUENZA A/B ANTIGENS    Narrative:     Performed at:  [facility]  [address]  [place] [address]   Phone [phone]   LIPASE    Narrative:     Performed at:  [facility]  [address] [place] [address]   Phone [phone]   TROPONIN    Narrative:     Performed at:  [facility]  [address] [facility], [place] [address]   Phone [phone]   BRAIN NATRIURETIC PEPTIDE    Narrative:     Performed at:  [facility]  [address] [place] [address]   Phone [phone]       All other labs were within normal range or not returned as of this dictation. EMERGENCY DEPARTMENT COURSE and DIFFERENTIAL DIAGNOSIS/MDM:   Vitals:    Vitals:    01/02/21 1956 01/02/21 1959 01/02/21 2145 01/02/21 2345   BP: (!) 148/98  (!) 147/95 139/82   Pulse: 66  65    Resp: 16  17    Temp: 97.1 °F (36.2 °C)      TempSrc: Oral      SpO2: 98%  97% 98%   Weight:  247 lb 2.2 oz (112.1 kg)     Height: 5' 10\" (1.778 m)          MDM     Patient presents ED with HPI noted above. He is hemodynamically stable, afebrile and nontoxic-appearing. He is not hypoxic with oxygen saturation of 98% on room air. Symptoms consistent with COVID-19 or other viral illness. Influenza negative. Covid PCR pending. Chest x-ray showed no acute process. Given chest discomfort obtain cardiac work-up. EKG showed no significant ST ovation or depression. Please see my attendings note regarding facial interpretation. Troponin within normal limits. Delta troponin not indicated chest pain has been ongoing for greater than 6 hours. Chest pain resolved in the ED with treatment of nausea and vomiting. Do not believe any further cardiac evaluation indicated at this time. Do not suspect ACS.   Patient educated on return precautions regarding chest pain. Regarding nausea and vomiting. Patient given 1 the IV fluid, IV Pepcid, oral Bentyl, IV Zofran. At reevaluation he voiced resolution of pain. He has no active emesis throughout duration of stay in the ED. He is tolerating oral p.o. Initially has some mild epigastric tenderness. He did have leukocytosis with WBC of 12.8. Pain resolved at reevaluation. He had a benign abdomen at reevaluation after treatment. Imaging held after discussion with patient. He understands she have acute worsening of pain or other concerning symptoms he is to promptly return to ED for reevaluation. Remainder of labs as above. Patient with no significant electrolyte abnormality, no renal parent. He does have mild elevation of ALT at 133 and AST at 64. This can be seen with COVID-19. Patient with no episodes of desaturation in the ED. Will discharge home with pulse ox. Patient educated return precautions. In addition to previously stated told return if any trouble breathing or shortness of breath. He is comfortable plan. Do suspect COVID-19 or other viral illness. Patient will quarantine at home pending test results. He was educated on quarantining. He is comfortable plan. He was discharged home in stable condition. The patient tolerated their visit well. I I have discussed the findings of today's workup with the patient and addressed the patient's questions and concerns. Important warning signs as well as new or worsening symptoms which would necessitate immediate return to the ED were discussed. The plan is to discharge from the ED at this time, and the patient is in stable condition. The patient acknowledged understanding is agreeable with this plan. CONSULTS:  None    PROCEDURES:  Procedures    FINAL IMPRESSION      1.  Suspected COVID-19 virus infection          DISPOSITION/PLAN   [unfilled]    PATIENT REFERRED TO:  [facility] Emergency Department  [address] [facility] [place] [address]  [phone]  Go to   If symptoms worsen    [facility]) Pre-Services  [phone]  Schedule an appointment as soon as possible for a visit   For follow up and reevaluation. BP check at ED follow up visit.       DISCHARGE MEDICATIONS:  Discharge Medication List as of 1/2/2021 11:41 PM      START taking these medications    Details   ondansetron (ZOFRAN ODT) 4 MG disintegrating tablet Take 1 tablet by mouth every 8 hours as needed for Nausea, Disp-20 tablet, R-0Print      famotidine (PEPCID) 20 MG tablet Take 1 tablet by mouth 2 times daily, Disp-60 tablet, R-0Print      dicyclomine (BENTYL) 10 MG capsule Take 1 capsule by mouth every 6 hours as needed (cramps), Disp-20 capsule, R-0Print      benzonatate (TESSALON PERLES) 100 MG capsule Take 1 capsule by mouth 3 times daily as needed for Cough, Disp-30 capsule, R-0Print             (Please note that portions of this note were completed with a voice recognition program.  Efforts were made to edit the dictations but occasionally words are mis-transcribed.)    [address] [facility]          [address] [place] [address]  01/03/21 1138

## 2021-01-03 NOTE — ED NOTES
Discharge and education instructions reviewed. Patient verbalized understanding, teach-back successful. Patient denied questions at this time. No acute distress noted. Patient instructed to follow-up as noted - return to emergency department if symptoms worsen. Patient verbalized understanding. Discharged per EDMD with discharged instructions.        Nora Leroy RN  01/02/21 7354

## 2021-01-03 NOTE — ED NOTES
Educated the pt on how to use a pulse ox; pt able to give a return demo. No further questions.       Reji Reyes RN  01/02/21 5289

## 2021-01-04 ENCOUNTER — CARE COORDINATION (OUTPATIENT)
Dept: CARE COORDINATION | Age: 46
End: 2021-01-04

## 2021-01-04 LAB
EKG ATRIAL RATE: 63 BPM
EKG DIAGNOSIS: NORMAL
EKG P AXIS: 65 DEGREES
EKG P-R INTERVAL: 174 MS
EKG Q-T INTERVAL: 434 MS
EKG QRS DURATION: 86 MS
EKG QTC CALCULATION (BAZETT): 444 MS
EKG R AXIS: 49 DEGREES
EKG T AXIS: 38 DEGREES
EKG VENTRICULAR RATE: 63 BPM

## 2021-01-04 PROCEDURE — 93010 ELECTROCARDIOGRAM REPORT: CPT | Performed by: INTERNAL MEDICINE

## 2021-01-04 NOTE — CARE COORDINATION
You Patient resolved from the Care Transitions episode on 1/4/2021  Discussed COVID-19 related testing which was available at this time. Test results were negative. Patient informed of results, if available? Yes    Pt declined assistance with making a new pt appt. Pt provided with New Provider Line [phone]. Pt aware number is on his Discharge instructions. Pt voiced understanding. Pt stated he is not interested in establishing with a provider since [employer] stinks and I just can't afford the co-pays\". Pt voiced he is using the [employer] ox and he is \"pretty impressed at how it works\". Most recent O2 97% and HR 63.     Patient/family has been provided the following resources and education related to COVID-19:                         Signs, symptoms and red flags related to COVID-19            CDC exposure and quarantine guidelines            [facility] exposure contact - [phone]            Contact for their local Department of Health               Patient currently reports that the following symptoms have improved:  no new/worsening symptoms     No further outreach scheduled with this CTN/ACM. Episode of Care resolved. Pt declined need for further calls at this time.

## 2021-01-04 NOTE — CARE COORDINATION
Chart reviewed. Pt seen and evaluated in ED for Suspected COVID. Pt given the following referrals/recommendations (see below): - Schedule an appointment with [facility]) Pre-Services; For follow up and reevaluation. BP check at ED follow up visit. Medication Changes       Benzonatate 100 mg Oral 3 TIMES DAILY PRN     Dicyclomine HCl 10 mg Oral EVERY 6 HOURS PRN     Famotidine 20 mg Oral 2 TIMES DAILY     Ondansetron 4 mg Oral EVERY 8 HOURS PRN    Initial ED f/u call to pt (See below for COVID results) attempted. Message left requesting return call. If no return call, will attempt second ED f/u call later today. SARS-CoV-2 Not Detected      FU appts/Provider:    No future appointments.

## 2021-02-09 ENCOUNTER — HOSPITAL ENCOUNTER (EMERGENCY)
Age: 46
Discharge: HOME | End: 2021-02-10
Attending: EMERGENCY MEDICINE
Payer: COMMERCIAL

## 2021-02-09 ENCOUNTER — APPOINTMENT (OUTPATIENT)
Dept: GENERAL RADIOLOGY | Age: 46
End: 2021-02-09
Payer: COMMERCIAL

## 2021-02-09 DIAGNOSIS — S61.211A LACERATION OF LEFT INDEX FINGER WITHOUT FOREIGN BODY WITHOUT DAMAGE TO NAIL, INITIAL ENCOUNTER: Primary | ICD-10-CM

## 2021-02-09 PROCEDURE — 99285 EMERGENCY DEPT VISIT HI MDM: CPT

## 2021-02-09 PROCEDURE — 73120 X-RAY EXAM OF HAND: CPT

## 2021-02-09 PROCEDURE — 12041 INTMD RPR N-HF/GENIT 2.5CM/<: CPT

## 2021-02-09 ASSESSMENT — PAIN DESCRIPTION - ORIENTATION: ORIENTATION: LEFT

## 2021-02-09 ASSESSMENT — PAIN DESCRIPTION - FREQUENCY: FREQUENCY: CONTINUOUS

## 2021-02-09 ASSESSMENT — PAIN DESCRIPTION - PROGRESSION: CLINICAL_PROGRESSION: NOT CHANGED

## 2021-02-09 ASSESSMENT — PAIN DESCRIPTION - PAIN TYPE: TYPE: ACUTE PAIN

## 2021-02-09 ASSESSMENT — PAIN DESCRIPTION - DESCRIPTORS: DESCRIPTORS: BURNING

## 2021-02-10 VITALS
TEMPERATURE: 98.1 F | HEART RATE: 98 BPM | DIASTOLIC BLOOD PRESSURE: 100 MMHG | OXYGEN SATURATION: 97 % | WEIGHT: 235 LBS | BODY MASS INDEX: 33.64 KG/M2 | HEIGHT: 70 IN | SYSTOLIC BLOOD PRESSURE: 136 MMHG | RESPIRATION RATE: 16 BRPM

## 2021-02-10 NOTE — ED NOTES
Dr. Eric Villeda cleaned and sutured finger laceration with 2 sutures and applied dressing. Discharge instructions reviewed with patient and verbalized understanding, denies further questions and successful teach back occurred. Discharged ambulatory with steady gait to ED cristopher. Written discharge instructions provided to patient.       Tia Mcclendon RN  02/10/21 4725

## 2021-02-10 NOTE — ED TRIAGE NOTES
 Patient presents as walk in patient with c/o cutting his finger with a  about 3 hours prior to presentation to ER. Patient states the  hit his finger while he was grinding metal. He is concerned there is still metal in his finger and also concerned that the laceration will not stop bleeding. Patient put a pressure dressing on his finger immediately after the incident occurred, but when he took a shower the wound started bleeding again. Sterile gauze and coban reapplied to injured area pending physician exam. Pulse, motor and sensation present in affected finger. Patient has a 1 cm x 0.3 cm l-shaped laceration on his index finger. When dressing removed, laceration began bleeding. Patient denies other injuries, he is awake, alert, oriented, respirations easy & regular, skin w/d, MMM & pink, cap refill brisk. Patient states he smoked marijuana after he cut his finger to ease the pain. Patient sitting on stretcher with call light near.    

## 2021-02-10 NOTE — ED PROVIDER NOTES
eMERGENCY dEPARTMENT eNCOUnter        [address] [facility]    Chief Complaint   Patient presents with    Laceration     Patient got left index finger caught in a grinding wheel about 3 hours ago       HPI    Patricia Astudillo is a 41 y.o. male who presents with finger laceration to his left index finger. Patient states that he hit his finger on a grinding wheel about 3 hours prior to arrival.  He states that there was significant bleeding so he came in. No exacerbating or relieving factors no other associated signs or symptoms    REVIEW OF SYSTEMS    See HPI for further details. Review of systems otherwise negative. 10 point review of systems reviewed and is otherwise negative  PAST MEDICAL HISTORY    Past Medical History:   Diagnosis Date    Heroin use     Hypertension     Kidney stone        SURGICAL HISTORY    Past Surgical History:   Procedure Laterality Date    MANDIBLE FRACTURE SURGERY Left     WISDOM TOOTH EXTRACTION         CURRENT MEDICATIONS    Current Outpatient Rx   Medication Sig Dispense Refill    Buprenorphine HCl-Naloxone HCl (SUBOXONE SL) Place under the tongue      ibuprofen (ADVIL) 200 MG tablet Take 2 tablets by mouth every 6 hours as needed for Pain for up to 8 doses. 24 tablet 0    amLODIPine (NORVASC) 5 MG tablet Take 1 tablet by mouth daily 30 tablet 0       ALLERGIES    No Known Allergies    FAMILY HISTORY    History reviewed. No pertinent family history.   Family history and social history reviewed and noncontributory  SOCIAL HISTORY    Social History     Socioeconomic History    Marital status: Single     Spouse name: None    Number of children: None    Years of education: None    Highest education level: None   Occupational History    None   Social Needs    Financial resource strain: None    Food insecurity     Worry: None     Inability: None    Transportation needs     Medical: None     Non-medical: None   Tobacco Use    Smoking status: Current Every Day Smoker Packs/day: 1.00     Types: Cigarettes    Smokeless tobacco: Never Used   Substance and Sexual Activity    Alcohol use: Yes     Alcohol/week: 0.0 standard drinks     Comment: occasional    Drug use: Yes     Types: Marijuana     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    Sexual activity: Yes     Partners: Female   Lifestyle    Physical activity     Days per week: None     Minutes per session: None    Stress: None   Relationships    Social connections     Talks on phone: None     Gets together: None     Attends Christian service: None     Active member of club or organization: None     Attends meetings of clubs or organizations: None     Relationship status: None    Intimate partner violence     Fear of current or ex partner: None     Emotionally abused: None     Physically abused: None     Forced sexual activity: None   Other Topics Concern    None   Social History Narrative    None       PHYSICAL EXAM    VITAL SIGNS: BP (!) 137/105   Pulse 107   Temp 98.1 °F (36.7 °C) (Oral)   Resp 17   Ht 5' 10\" (1.778 m)   Wt 235 lb (106.6 kg)   SpO2 98%   BMI 33.72 kg/m²   Constitutional:  Well developed, well nourished, no acute distress, non-toxic appearance   HENT:  Atraumatic, external ears normal, nose normal, oropharynx moist, no pharyngeal exudates. Neck- supple   Respiratory:  No respiratory distress, normal breath sounds   Cardiovascular:  Normal rate, normal rhythm, no murmurs, no gallops, no rubs   GI:  Soft, nondistended, normal bowel sounds, nontender, no organomegaly   Musculoskeletal:  No edema, no tenderness, no deformities. Integument: On the left index finger dorsally over the PIP there is 1/2 cm laceration. Sensation is intact distally      RADIOLOGY/PROCEDURES    He was cleansed and draped in sterile fashion and anesthetized with about 2 cc of 1% lidocaine without epinephrine. Wound was copiously irrigated and there was some debridement of tissue on the surface.   It was explored and was able to remove some foreign material from inside the wound. It was copiously irrigated first with Shur-Clens and then with saline. After the wound surface was clean I was able to put in two 5-0 nylon sutures in interrupted fashion. He tolerated this appropriately. ED COURSE & MEDICAL DECISION MAKING    Pertinent Labs & Imaging studies reviewed. (See chart for details)  This patient had a finger laceration as noted above. Repaired as above. He is return in 7 to 10 days or earlier for signs of infection as discussed. He is discharged home in good condition    FINAL IMPRESSION    1. Left index finger laceration  2.           Gisella Claros MD  02/10/21 1600

## 2021-02-13 ENCOUNTER — HOSPITAL ENCOUNTER (EMERGENCY)
Age: 46
Discharge: HOME | End: 2021-02-13
Attending: EMERGENCY MEDICINE
Payer: COMMERCIAL

## 2021-02-13 VITALS
HEART RATE: 80 BPM | WEIGHT: 235.01 LBS | RESPIRATION RATE: 17 BRPM | DIASTOLIC BLOOD PRESSURE: 102 MMHG | BODY MASS INDEX: 33.64 KG/M2 | HEIGHT: 70 IN | TEMPERATURE: 98.3 F | OXYGEN SATURATION: 100 % | SYSTOLIC BLOOD PRESSURE: 157 MMHG

## 2021-02-13 DIAGNOSIS — Z51.89 ENCOUNTER FOR WOUND RE-CHECK: Primary | ICD-10-CM

## 2021-02-13 PROCEDURE — 99283 EMERGENCY DEPT VISIT LOW MDM: CPT

## 2021-02-13 NOTE — ED PROVIDER NOTES
his wound recheck. He had some concerns that it may have pulled apart. The wound actually looks good. He had 2 sutures placed and there were 2 tight sutures in place. There was no wound dehiscence. There is no signs of infection. He will continue local wound care. He is to keep it covered during the day when he is working. I want him to leave it open to the air at night. I told him he needs to leave them in for at least a total of 10 days up to 14. So he should follow-up in 6 to 10 days for removal.  He can return here in the interim if he has any problems. PROCEDURES:  None    FINAL IMPRESSION      1.  Encounter for wound re-check          DISPOSITION/PLAN   DISPOSITION Decision To Discharge 02/13/2021 05:24:42 PM      PATIENT REFERRED TO:  PCP    In 10 days  For suture removal      DISCHARGE MEDICATIONS:  New Prescriptions    No medications on file       (Please note that portions of this note were completed with a voice recognition program.  Efforts were made to edit the dictations but occasionally words are mis-transcribed.)    Lizandro Piña MD  Attending Emergency Physician        El Price MD  02/13/21 3296

## 2021-02-13 NOTE — ED TRIAGE NOTES
Patient had sutures placed on 2/9/21. Concerned that one of the knots of the suture is under the skin. He has been working a lot and unable to leave it open to air. The wound has a white, moist appearance. Two sutures are intact.

## 2021-05-30 ENCOUNTER — APPOINTMENT (OUTPATIENT)
Dept: ULTRASOUND IMAGING | Age: 46
End: 2021-05-30
Payer: COMMERCIAL

## 2021-05-30 ENCOUNTER — APPOINTMENT (OUTPATIENT)
Dept: CT IMAGING | Age: 46
End: 2021-05-30
Payer: COMMERCIAL

## 2021-05-30 ENCOUNTER — HOSPITAL ENCOUNTER (EMERGENCY)
Age: 46
Discharge: HOME | End: 2021-05-30
Attending: EMERGENCY MEDICINE
Payer: COMMERCIAL

## 2021-05-30 VITALS
HEIGHT: 70 IN | RESPIRATION RATE: 18 BRPM | BODY MASS INDEX: 31.5 KG/M2 | WEIGHT: 220 LBS | SYSTOLIC BLOOD PRESSURE: 189 MMHG | OXYGEN SATURATION: 100 % | HEART RATE: 80 BPM | TEMPERATURE: 98.2 F | DIASTOLIC BLOOD PRESSURE: 110 MMHG

## 2021-05-30 DIAGNOSIS — M54.9 MUSCULOSKELETAL BACK PAIN: Primary | ICD-10-CM

## 2021-05-30 LAB
A/G RATIO: 1.5 (ref 1.1–2.2)
ALBUMIN SERPL-MCNC: 4.5 G/DL (ref 3.4–5)
ALP BLD-CCNC: 72 U/L (ref 40–129)
ALT SERPL-CCNC: 48 U/L (ref 10–40)
ANION GAP SERPL CALCULATED.3IONS-SCNC: 7 MMOL/L (ref 3–16)
AST SERPL-CCNC: 25 U/L (ref 15–37)
BASOPHILS ABSOLUTE: 0.1 K/UL (ref 0–0.2)
BASOPHILS RELATIVE PERCENT: 0.6 %
BILIRUB SERPL-MCNC: 0.4 MG/DL (ref 0–1)
BILIRUBIN URINE: NEGATIVE
BLOOD, URINE: NEGATIVE
BUN BLDV-MCNC: 10 MG/DL (ref 7–20)
CALCIUM SERPL-MCNC: 9.5 MG/DL (ref 8.3–10.6)
CHLORIDE BLD-SCNC: 97 MMOL/L (ref 99–110)
CLARITY: CLEAR
CO2: 27 MMOL/L (ref 21–32)
COLOR: YELLOW
CREAT SERPL-MCNC: 0.8 MG/DL (ref 0.9–1.3)
EOSINOPHILS ABSOLUTE: 0.2 K/UL (ref 0–0.6)
EOSINOPHILS RELATIVE PERCENT: 2.3 %
GFR AFRICAN AMERICAN: >60
GFR NON-AFRICAN AMERICAN: >60
GLOBULIN: 3 G/DL
GLUCOSE BLD-MCNC: 121 MG/DL (ref 70–99)
GLUCOSE URINE: NEGATIVE MG/DL
HCT VFR BLD CALC: 46.3 % (ref 40.5–52.5)
HEMOGLOBIN: 15.7 G/DL (ref 13.5–17.5)
KETONES, URINE: NEGATIVE MG/DL
LEUKOCYTE ESTERASE, URINE: NEGATIVE
LIPASE: 32 U/L (ref 13–60)
LYMPHOCYTES ABSOLUTE: 1.6 K/UL (ref 1–5.1)
LYMPHOCYTES RELATIVE PERCENT: 17.2 %
MCH RBC QN AUTO: 30.5 PG (ref 26–34)
MCHC RBC AUTO-ENTMCNC: 33.9 G/DL (ref 31–36)
MCV RBC AUTO: 90.1 FL (ref 80–100)
MICROSCOPIC EXAMINATION: NORMAL
MONOCYTES ABSOLUTE: 0.7 K/UL (ref 0–1.3)
MONOCYTES RELATIVE PERCENT: 7 %
NEUTROPHILS ABSOLUTE: 6.9 K/UL (ref 1.7–7.7)
NEUTROPHILS RELATIVE PERCENT: 72.9 %
NITRITE, URINE: NEGATIVE
PDW BLD-RTO: 13.4 % (ref 12.4–15.4)
PH UA: 7 (ref 5–8)
PLATELET # BLD: 237 K/UL (ref 135–450)
PMV BLD AUTO: 8.6 FL (ref 5–10.5)
POTASSIUM REFLEX MAGNESIUM: 4.1 MMOL/L (ref 3.5–5.1)
PROTEIN UA: NEGATIVE MG/DL
RBC # BLD: 5.13 M/UL (ref 4.2–5.9)
SODIUM BLD-SCNC: 131 MMOL/L (ref 136–145)
SPECIFIC GRAVITY UA: <1.005 (ref 1–1.03)
TOTAL PROTEIN: 7.5 G/DL (ref 6.4–8.2)
URINE REFLEX TO CULTURE: NORMAL
URINE TYPE: NORMAL
UROBILINOGEN, URINE: 0.2 E.U./DL
WBC # BLD: 9.5 K/UL (ref 4–11)

## 2021-05-30 PROCEDURE — 81003 URINALYSIS AUTO W/O SCOPE: CPT

## 2021-05-30 PROCEDURE — 6370000000 HC RX 637 (ALT 250 FOR IP): Performed by: NURSE PRACTITIONER

## 2021-05-30 PROCEDURE — 76870 US EXAM SCROTUM: CPT

## 2021-05-30 PROCEDURE — 36415 COLL VENOUS BLD VENIPUNCTURE: CPT

## 2021-05-30 PROCEDURE — 85025 COMPLETE CBC W/AUTO DIFF WBC: CPT

## 2021-05-30 PROCEDURE — 87591 N.GONORRHOEAE DNA AMP PROB: CPT

## 2021-05-30 PROCEDURE — 96374 THER/PROPH/DIAG INJ IV PUSH: CPT

## 2021-05-30 PROCEDURE — 83690 ASSAY OF LIPASE: CPT

## 2021-05-30 PROCEDURE — 2580000003 HC RX 258: Performed by: NURSE PRACTITIONER

## 2021-05-30 PROCEDURE — 6360000002 HC RX W HCPCS: Performed by: NURSE PRACTITIONER

## 2021-05-30 PROCEDURE — 99285 EMERGENCY DEPT VISIT HI MDM: CPT

## 2021-05-30 PROCEDURE — 87491 CHLMYD TRACH DNA AMP PROBE: CPT

## 2021-05-30 PROCEDURE — 6360000004 HC RX CONTRAST MEDICATION: Performed by: EMERGENCY MEDICINE

## 2021-05-30 PROCEDURE — 74177 CT ABD & PELVIS W/CONTRAST: CPT

## 2021-05-30 PROCEDURE — 80053 COMPREHEN METABOLIC PANEL: CPT

## 2021-05-30 RX ORDER — METHOCARBAMOL 750 MG/1
750 TABLET, FILM COATED ORAL 4 TIMES DAILY
Qty: 40 TABLET | Refills: 0 | Status: SHIPPED | OUTPATIENT
Start: 2021-05-30 | End: 2021-06-09

## 2021-05-30 RX ORDER — ACETAMINOPHEN 325 MG/1
650 TABLET ORAL ONCE
Status: COMPLETED | OUTPATIENT
Start: 2021-05-30 | End: 2021-05-30

## 2021-05-30 RX ORDER — METHOCARBAMOL 750 MG/1
1500 TABLET, FILM COATED ORAL ONCE
Status: COMPLETED | OUTPATIENT
Start: 2021-05-30 | End: 2021-05-30

## 2021-05-30 RX ORDER — ONDANSETRON 2 MG/ML
4 INJECTION INTRAMUSCULAR; INTRAVENOUS ONCE
Status: COMPLETED | OUTPATIENT
Start: 2021-05-30 | End: 2021-05-30

## 2021-05-30 RX ORDER — 0.9 % SODIUM CHLORIDE 0.9 %
1000 INTRAVENOUS SOLUTION INTRAVENOUS ONCE
Status: COMPLETED | OUTPATIENT
Start: 2021-05-30 | End: 2021-05-30

## 2021-05-30 RX ORDER — TRAMADOL HYDROCHLORIDE 50 MG/1
50 TABLET ORAL ONCE
Status: COMPLETED | OUTPATIENT
Start: 2021-05-30 | End: 2021-05-30

## 2021-05-30 RX ADMIN — TRAMADOL HYDROCHLORIDE 50 MG: 50 TABLET ORAL at 11:23

## 2021-05-30 RX ADMIN — ACETAMINOPHEN 650 MG: 325 TABLET ORAL at 11:23

## 2021-05-30 RX ADMIN — SODIUM CHLORIDE 1000 ML: 9 INJECTION, SOLUTION INTRAVENOUS at 11:23

## 2021-05-30 RX ADMIN — ONDANSETRON 4 MG: 2 INJECTION INTRAMUSCULAR; INTRAVENOUS at 11:23

## 2021-05-30 RX ADMIN — IOPAMIDOL 75 ML: 755 INJECTION, SOLUTION INTRAVENOUS at 12:11

## 2021-05-30 RX ADMIN — METHOCARBAMOL TABLETS 1500 MG: 750 TABLET, COATED ORAL at 14:34

## 2021-05-30 ASSESSMENT — PAIN SCALES - GENERAL
PAINLEVEL_OUTOF10: 6
PAINLEVEL_OUTOF10: 0
PAINLEVEL_OUTOF10: 9

## 2021-05-30 NOTE — ED PROVIDER NOTES
Attending Supervisory Note/Shared Visit   I have personally performed a face to face diagnostic evaluation on this patient. I have reviewed the mid-levels findings and agree. History and Exam by me shows a 52-year-old male who presents for evaluation of left lower back pain as well as left testicular pain. He reports that the pain has been going on for about a month, but worse over the past few days. He denies any dysuria or hematuria. He states he has had epididymitis in the past.  Denies any urethral drainage. Denies any midline back pain or numbness weakness in the lower extremities. He has no bowel or bladder incontinence or retention. Patient has a history of IVDA, but has not injected in 6 years he is now on Suboxone. On exam he is well-appearing, afebrile, with reassuring vital signs. He has no midline spinal tenderness, some mild reproducible tenderness in the left lower lumbar region, no CVA tenderness. Normal neurovascular exam in the bilateral lower extremities. CBC is within normal limits. CMP shows mild hyponatremia, no other abnormality. Lipase is normal.  Urinalysis is negative for infection and hematuria. Testicular ultrasound shows no evidence of torsion or epididymitis. CT of abdomen pelvis shows some intrarenal left nephrolithiasis, but no acute process. Patient was counseled that symptoms may be musculoskeletal in nature, recommended continuation of Tylenol Motrin and will prescribe a muscle relaxer as well to take as needed. Discharged in stable condition.       Saira Birch MD  Attending Emergency Physician        Saira Birch MD  05/30/21 2053

## 2021-05-30 NOTE — ED PROVIDER NOTES
[facility] Emergency Department    CHIEF COMPLAINT  Back Pain (patient c/o back pain \"for a few weeks\" but states that it got much worse last night. pt. states \"I think wes ti have kidney stones. Both of my kidneys are hurting\". ) and Groin Swelling (pt c/o left testicle pain x1 month but got worse last night. )      SHARED SERVICE VISIT  I have seen and evaluated this patient with my supervising physician, Dr. Zhao. HISTORY OF PRESENT ILLNESS  Brooklynn Samson is a 40 y.o. well-appearing but distressed male who presents to the ED complaining of a \"few weeks history\" of \"sharp\" 10/10 bilateral flank pain >on the left that worsened last night and a 1 month history of \"aching\" 8/10 left testicular pain that also worsened last night. Accompanying symptoms include nausea, suprapubic abdominal tenderness, and lower left quadrant abdominal pain. He denies vomiting, dizziness, presyncope, shortness of breath, fever, chills, sweats, urinary symptoms, penile discharge, or other complaints. No other complaints, modifying factors or associated symptoms. Nursing notes reviewed. Past Medical History:   Diagnosis Date    Heroin use     Hypertension     Kidney stone      Past Surgical History:   Procedure Laterality Date    MANDIBLE FRACTURE SURGERY Left     WISDOM TOOTH EXTRACTION       No family history on file. Social History     Socioeconomic History    Marital status: Single     Spouse name: Not on file    Number of children: Not on file    Years of education: Not on file    Highest education level: Not on file   Occupational History    Not on file   Tobacco Use    Smoking status: Current Every Day Smoker     Packs/day: 1.00     Types: Cigarettes    Smokeless tobacco: Never Used   Vaping Use    Vaping Use: Never used   Substance and Sexual Activity    Alcohol use:  Yes     Alcohol/week: 0.0 standard drinks     Comment: occasional    Drug use: Yes     Types: Marijuana 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    Sexual activity: Yes     Partners: Female   Other Topics Concern    Not on file   Social History Narrative    Not on file     Social Determinants of Health     Financial Resource Strain:     Difficulty of Paying Living Expenses:    Food Insecurity:     Worried About Running Out of Food in the Last Year:     [address] in the Last Year:    Transportation Needs:     Lack of Transportation (Medical):  Lack of Transportation (Non-Medical):    Physical Activity:     Days of Exercise per Week:     Minutes of Exercise per Session:    Stress:     Feeling of Stress :    Social Connections:     Frequency of Communication with Friends and Family:     Frequency of Social Gatherings with Friends and Family:     Attends Orthodoxy Services:     Active Member of Clubs or Organizations:     Attends Club or Organization Meetings:     Marital Status:    Intimate Partner Violence:     Fear of Current or Ex-Partner:     Emotionally Abused:     Physically Abused:     Sexually Abused:      No current facility-administered medications for this encounter. Current Outpatient Medications   Medication Sig Dispense Refill    amLODIPine (NORVASC) 5 MG tablet Take 1 tablet by mouth daily 30 tablet 0    Buprenorphine HCl-Naloxone HCl (SUBOXONE SL) Place under the tongue      ibuprofen (ADVIL) 200 MG tablet Take 2 tablets by mouth every 6 hours as needed for Pain for up to 8 doses. 24 tablet 0     No Known Allergies    REVIEW OF SYSTEMS  10 systems reviewed, pertinent positives per HPI otherwise noted to be negative    PHYSICAL EXAM  BP (!) 189/110   Pulse 80   Temp 98.2 °F (36.8 °C)   Resp 18   Ht 5' 10\" (1.778 m)   Wt 220 lb (99.8 kg)   SpO2 100%   BMI 31.57 kg/m²   GENERAL APPEARANCE: Awake and alert. Cooperative.  + Distress. Non-- toxic in appearance. HEAD: Normocephalic. Atraumatic. EYES: PERRL. EOM's grossly intact.    ENT: Mucous membranes are moist.   NECK: Supple. There is no midline cervical spine tenderness upon palpation. HEART: RRR. No murmurs, rubs, gallops.  + S1-S2.  LUNGS: Respirations unlabored. CTAB. Good air exchange. Speaking comfortably in full sentences. ABDOMEN: Soft. Non-distended.  + LLQ tenderness upon palpation.  + Guarding. No rebound.  + Bowel sounds x 4 quadrants. Negative Rovsing, psoas, obturator, and Angel signs. There is no McBurney's point tenderness. No masses. No organomegaly. There is suprapubic tenderness. Back: + Left CVAT. There is no midline thoracic or lumbar spine tenderness upon palpation. : + Diminished to absent bilateral cremasteric reflex. Bilateral testicles are warm with bilateral tenderness. There is no penile discharge. Uncircumcised male. Exam performed by this NP and chaperoned by ED RN Fran Asencio. EXTREMITIES: No peripheral edema. Moves all extremities equally. All extremities neurovascularly intact. SKIN: Warm and dry. No acute rashes. NEUROLOGICAL: Alert and oriented. CN's 2-12 intact. No gross facial drooping. Strength 5/5, sensation intact. PSYCHIATRIC: Normal mood and affect. RADIOLOGY  No results found. ED COURSE  Patient received tramadol and Tylenol for pain, with good relief. Triage vitals stable but hypertensive at 189/110 mmHg. A  workup was initiated including lipase, CBC, CMP, as well as reflex to culture, C. trachomatis N. Gonorrhea DNA, urine, CT abdomen pelvis, US scrotum and testicles.            Labs Ordered:  I have reviewed and interpreted all of the currently available lab results from this visit:  Results for orders placed or performed during the hospital encounter of 05/30/21   Urinalysis Reflex to Culture    Specimen: Urine, clean catch   Result Value Ref Range    Color, UA YELLOW Straw/Yellow    Clarity, UA Clear Clear    Glucose, Ur Negative Negative mg/dL    Bilirubin Urine Negative Negative    Ketones, Urine Negative Negative mg/dL    Specific Gravity, UA <1.005 1.005 - 1.030    Blood, Urine Negative Negative    pH, UA 7.0 5.0 - 8.0    Protein, UA Negative Negative mg/dL    Urobilinogen, Urine 0.2 <2.0 E.U./dL    Nitrite, Urine Negative Negative    Leukocyte Esterase, Urine Negative Negative    Microscopic Examination Not Indicated     Urine Type NotGiven     Urine Reflex to Culture Not Indicated    CBC Auto Differential   Result Value Ref Range    WBC 9.5 4.0 - 11.0 K/uL    RBC 5.13 4.20 - 5.90 M/uL    Hemoglobin 15.7 13.5 - 17.5 g/dL    Hematocrit 46.3 40.5 - 52.5 %    MCV 90.1 80.0 - 100.0 fL    MCH 30.5 26.0 - 34.0 pg    MCHC 33.9 31.0 - 36.0 g/dL    RDW 13.4 12.4 - 15.4 %    Platelets 001 120 - 273 K/uL    MPV 8.6 5.0 - 10.5 fL    Neutrophils % 72.9 %    Lymphocytes % 17.2 %    Monocytes % 7.0 %    Eosinophils % 2.3 %    Basophils % 0.6 %    Neutrophils Absolute 6.9 1.7 - 7.7 K/uL    Lymphocytes Absolute 1.6 1.0 - 5.1 K/uL    Monocytes Absolute 0.7 0.0 - 1.3 K/uL    Eosinophils Absolute 0.2 0.0 - 0.6 K/uL    Basophils Absolute 0.1 0.0 - 0.2 K/uL   Comprehensive Metabolic Panel w/ Reflex to MG   Result Value Ref Range    Sodium 131 (L) 136 - 145 mmol/L    Potassium reflex Magnesium 4.1 3.5 - 5.1 mmol/L    Chloride 97 (L) 99 - 110 mmol/L    CO2 27 21 - 32 mmol/L    Anion Gap 7 3 - 16    Glucose 121 (H) 70 - 99 mg/dL    BUN 10 7 - 20 mg/dL    CREATININE 0.8 (L) 0.9 - 1.3 mg/dL    GFR Non-African American >60 >60    GFR African American >60 >60    Calcium 9.5 8.3 - 10.6 mg/dL    Total Protein 7.5 6.4 - 8.2 g/dL    Albumin 4.5 3.4 - 5.0 g/dL    Albumin/Globulin Ratio 1.5 1.1 - 2.2    Total Bilirubin 0.4 0.0 - 1.0 mg/dL    Alkaline Phosphatase 72 40 - 129 U/L    ALT 48 (H) 10 - 40 U/L    AST 25 15 - 37 U/L    Globulin 3.0 g/dL   Lipase   Result Value Ref Range    Lipase 32.0 13.0 - 60.0 U/L           Imaging ordered:  US SCROTUM AND TESTICLES    Result Date: 5/30/2021  Unremarkable testicular ultrasound with normal Doppler flow.      CT ABDOMEN PELVIS W IV induction of diuresis as well as electrolyte repletion-sodium and chloride while here in the emergency department. Therefore:    My attending physician and I feel that the patient is safe and appropriate for discharge to home with close outpatient follow-up with PCP in the next 1-2 days. Patient will be treated with Robaxin and is instructed to return to the emergency department immediately for new or worsening symptoms including, but not limited to, developing worsening back or testicular pain pain, inability to urinate, loss of sensation in your lower extremities, inability to ambulate, or other concerns. Patient verbalizes understanding and is agreeable with plan for discharge and follow-up. Clinical Impression:  Musculoskeletal pain      DISPOSITION  Discharged        Zoila Santana [facility]    This chart was created using Dragon dictation. Every effort was made by myself to ensure accuracy, however due to limitations of this technology errors may be present.       ADELAIDE Hill - MIGUEL ANGEL  06/01/21 1151

## 2021-06-01 LAB
C. TRACHOMATIS DNA ,URINE: NEGATIVE
N. GONORRHOEAE DNA, URINE: NEGATIVE

## 2022-07-04 ENCOUNTER — HOSPITAL ENCOUNTER (EMERGENCY)
Age: 47
Discharge: HOME | End: 2022-07-04
Attending: EMERGENCY MEDICINE
Payer: COMMERCIAL

## 2022-07-04 VITALS
RESPIRATION RATE: 16 BRPM | HEART RATE: 93 BPM | DIASTOLIC BLOOD PRESSURE: 112 MMHG | TEMPERATURE: 96.2 F | HEIGHT: 70 IN | WEIGHT: 210 LBS | OXYGEN SATURATION: 98 % | SYSTOLIC BLOOD PRESSURE: 165 MMHG | BODY MASS INDEX: 30.06 KG/M2

## 2022-07-04 DIAGNOSIS — T15.02XA CORNEAL FOREIGN BODY, LEFT, INITIAL ENCOUNTER: Primary | ICD-10-CM

## 2022-07-04 DIAGNOSIS — S05.02XA LEFT CORNEAL ABRASION, INITIAL ENCOUNTER: ICD-10-CM

## 2022-07-04 PROCEDURE — 6370000000 HC RX 637 (ALT 250 FOR IP): Performed by: EMERGENCY MEDICINE

## 2022-07-04 PROCEDURE — 65222 REMOVE FOREIGN BODY FROM EYE: CPT

## 2022-07-04 PROCEDURE — 99283 EMERGENCY DEPT VISIT LOW MDM: CPT

## 2022-07-04 RX ORDER — TETRACAINE HYDROCHLORIDE 5 MG/ML
1 SOLUTION OPHTHALMIC ONCE
Status: COMPLETED | OUTPATIENT
Start: 2022-07-04 | End: 2022-07-04

## 2022-07-04 RX ORDER — BACITRACIN ZINC AND POLYMYXIN B SULFATE 500; 10000 [USP'U]/G; [USP'U]/G
OINTMENT OPHTHALMIC
Qty: 1 G | Refills: 0 | Status: SHIPPED | OUTPATIENT
Start: 2022-07-04

## 2022-07-04 RX ADMIN — FLUORESCEIN SODIUM 1 MG: 1 STRIP OPHTHALMIC at 05:37

## 2022-07-04 RX ADMIN — TETRACAINE HYDROCHLORIDE 1 DROP: 5 SOLUTION OPHTHALMIC at 05:36

## 2022-07-04 ASSESSMENT — VISUAL ACUITY
OD: 20/40
OS: 20/30

## 2022-07-04 ASSESSMENT — PAIN DESCRIPTION - PAIN TYPE: TYPE: ACUTE PAIN

## 2022-07-04 ASSESSMENT — PAIN SCALES - GENERAL: PAINLEVEL_OUTOF10: 6

## 2022-07-04 ASSESSMENT — PAIN - FUNCTIONAL ASSESSMENT
PAIN_FUNCTIONAL_ASSESSMENT: NONE - DENIES PAIN
PAIN_FUNCTIONAL_ASSESSMENT: PREVENTS OR INTERFERES SOME ACTIVE ACTIVITIES AND ADLS
PAIN_FUNCTIONAL_ASSESSMENT: 0-10

## 2022-07-04 ASSESSMENT — PAIN DESCRIPTION - LOCATION: LOCATION: EYE

## 2022-07-04 ASSESSMENT — PAIN DESCRIPTION - ORIENTATION: ORIENTATION: RIGHT

## 2022-07-04 ASSESSMENT — PAIN DESCRIPTION - FREQUENCY: FREQUENCY: CONTINUOUS

## 2022-07-04 ASSESSMENT — PAIN DESCRIPTION - DESCRIPTORS: DESCRIPTORS: SHARP

## 2022-07-04 NOTE — Clinical Note
Cristela Bowman was seen and treated in our emergency department on 7/4/2022. He may return to work on 07/07/2022. If you have any questions or concerns, please don't hesitate to call.       Elier Foster, DO

## 2022-07-04 NOTE — ED PROVIDER NOTES
[address]      Pt Name: Cielo Morales  MRN: [id number]  [place] 1975  Date of evaluation: 7/4/2022  Provider: Lindsey Mi DO    CHIEF COMPLAINT       Chief Complaint   Patient presents with    Foreign Body in Eye     States FB in eye since 7/1/22, possibly piece of metal         HISTORY OF PRESENT ILLNESS   (Location/Symptom, Timing/Onset, Context/Setting, Quality, Duration, Modifying Factors, Severity)  Note limiting factors. Cielo Morales is a 56 y.o. male who presents to the emergency department with a complaint of a foreign body sensation in the left eye since July 1, 3 days ago. He states that it occurred at work. He works as a . He was wearing gloves at the time that had some metallic debris on them but does not remember touching his eye. He denies any moment of anything falling in his eye. He flushed his eye with saline flush several times without success. He does not wear glasses or contact lenses. He denies any fever or chills. No drainage or discharge. He denies any headache. He denies any issues with the right eye. No cough or cold symptoms. No facial rash. Nursing Notes were reviewed. HPI        REVIEW OF SYSTEMS    (2-9 systems for level 4, 10 or more for level 5)       Constitutional: Negative for fever or chills. HENT: Negative for rhinorrhea and sore throat. Eyes: Negative for redness or drainage. Respiratory: Negative for shortness of breath or dyspnea on exertion. Cardiovascular: Negative for chest pain. Neurological: Negative for headache. All systems are reviewed and are negative except for those listed above in the history of present illness and ROS.         PAST MEDICAL HISTORY     Past Medical History:   Diagnosis Date    Heroin use     Hypertension     Kidney stone          SURGICAL HISTORY       Past Surgical History:   Procedure Laterality Date    MANDIBLE FRACTURE SURGERY Left     WISDOM TOOTH EXTRACTION           CURRENT MEDICATIONS       Previous Medications    BUPRENORPHINE HCL-NALOXONE HCL (SUBOXONE SL)    Place under the tongue       ALLERGIES     Patient has no known allergies. FAMILY HISTORY     History reviewed. No pertinent family history. SOCIAL HISTORY       Social History     Socioeconomic History    Marital status: Single     Spouse name: None    Number of children: None    Years of education: None    Highest education level: None   Occupational History    None   Tobacco Use    Smoking status: Current Every Day Smoker     Packs/day: 1.00     Types: Cigarettes    Smokeless tobacco: Never Used   Vaping Use    Vaping Use: Never used   Substance and Sexual Activity    Alcohol use: Yes     Alcohol/week: 0.0 standard drinks     Comment: occasional    Drug use: Not Currently     Types: Marijuana Masters Carmela)     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    Sexual activity: Yes     Partners: Female   Other Topics Concern    None   Social History Narrative    None     Social Determinants of Health     Financial Resource Strain:     Difficulty of Paying Living Expenses: Not on file   Food Insecurity:     Worried About Running Out of Food in the Last Year: Not on file    [address] of Food in the Last Year: Not on file   Transportation Needs:     Lack of Transportation (Medical): Not on file    Lack of Transportation (Non-Medical):  Not on file   Physical Activity:     Days of Exercise per Week: Not on file    Minutes of Exercise per Session: Not on file   Stress:     Feeling of Stress : Not on file   Social Connections:     Frequency of Communication with Friends and Family: Not on file    Frequency of Social Gatherings with Friends and Family: Not on file    Attends Restoration Services: Not on file    Active Member of Clubs or Organizations: Not on file    Attends Club or Organization Meetings: Not on file    Marital Status: Not on file Intimate Partner Violence:     Fear of Current or Ex-Partner: Not on file    Emotionally Abused: Not on file    Physically Abused: Not on file    Sexually Abused: Not on file   Housing Stability:     Unable to Pay for Housing in the Last Year: Not on file    Number of [place] in the Last Year: Not on file    Unstable Housing in the Last Year: Not on file       SCREENINGS    [place] Coma Scale  Eye Opening: Spontaneous  Best Verbal Response: Oriented  Best Motor Response: Obeys commands  [place] Coma Scale Score: 15        PHYSICAL EXAM    (up to 7 for level 4, 8 or more for level 5)     ED Triage Vitals [07/04/22 0518]   BP Temp Temp Source Heart Rate Resp SpO2 Height Weight   (!) 165/112 (!) 96.2 °F (35.7 °C) Tympanic 93 16 98 % 5' 10\" (1.778 m) 210 lb (95.3 kg)         Physical Exam   Constitutional: Awake and alert. Very pleasant. Mild discomfort. Head: No visible evidence of trauma. Normocephalic. Eyes: Pupils equal and reactive. Pulls 3 mm and reactive bilaterally. Fundi without hemorrhage or papilledema. Lash margins were clear. No discharge. Lacrimal ducts were patent. No pain with extraocular movement. Sclera white. Conjunctive a pink. Anterior chambers were clear. Lids were everted. There was no evidence of foreign body on the lids. No photophobia. Conjunctiva normal.  In the 9 o'clock position in the left eye there is a metallic foreign body that is superficial.  HENT: Oral mucosa moist.  Airway patent. Nares were clear. No facial rash. Neck:  Soft and supple. Nontender. Heart:  Regular rate and rhythm. Lungs:   No conversational dyspnea or accessory muscle use. Musculoskeletal: Extremities non-tender with full range of motion. Neurological: Alert and oriented x 3. Speech clear. No facial droop. No acute focal motor or sensory deficits. Skin: Skin is warm and dry. No rash. Psychiatric: Normal mood and affect.  Behavior is normal.         DIAGNOSTIC RESULTS     EKG: All EKG's are interpreted by the Emergency Department Physician who either signs or Co-signs this chart in the absence of a cardiologist.        RADIOLOGY:   Non-plain film images such as CT, Ultrasound and MRI are read by the radiologist. Plain radiographic images are visualized and preliminarily interpreted by the emergency physician with the below findings:        Interpretation per the Radiologist below, if available at the time of this note:    No orders to display         ED BEDSIDE ULTRASOUND:   Performed by ED Physician - none    LABS:  Labs Reviewed - No data to display    All other labs were within normal range or not returned as of this dictation. EMERGENCY DEPARTMENT COURSE and DIFFERENTIAL DIAGNOSIS/MDM:   Vitals:    Vitals:    07/04/22 0518   BP: (!) 165/112   Pulse: 93   Resp: 16   Temp: (!) 96.2 °F (35.7 °C)   TempSrc: Tympanic   SpO2: 98%   Weight: 210 lb (95.3 kg)   Height: 5' 10\" (1.778 m)         MDM      The patient presents with a foreign body to the left eye. He does have a metallic foreign body in the 9 o'clock position on the left cornea. No other foreign bodies identified. Visual acuity is grossly intact. Please refer to the nursing notes. Corneal foreign body was removed as noted below. Patient has residual corneal abrasion as well as some other superficial abrasions to the cornea that likely occurred before the foreign body became seated in the cornea. He will be treated with Polysporin ophthalmic ointment, 1/2 inch, 3 times daily for 3 to 5 days to the left eye. He was advised to not drive or operate machinery while using the ointment. May interfere with vision. I advised him to avoid bright light, do not rub or scratch the eye, drink plenty of fluids. I advised him to call first thing in the morning on July 5, Tuesday to schedule an appointment to be seen on Tuesday with [address]. He verbalized understanding of the treatment plan. If condition worsens or new symptoms develop, return immediately to the emergency department. REASSESSMENT              CRITICAL CARE TIME   Total Critical Care time was 0 minutes, excluding separately reportable procedures. There was a high probability of clinically significant/life threatening deterioration in the patient's condition which required my urgent intervention. CONSULTS:  None    PROCEDURES:  Unless otherwise noted below, none     Procedures    Slit-lamp exam with removal of left corneal foreign body:    The patient was given 2 drops of tetracaine ophthalmic solution 0.5% and 2 drops of fluorescein stain to the left eye. He was examined at the slit lamp. Lids were everted. No evidence of foreign body. On the left cornea in the 9 o'clock position there is a metallic foreign body located superficially. I was able to lift this off of the surface of the cornea with a 25-gauge needle. He does have some mild surrounding haziness of fluorescein uptake. Foreign body was superficial.  There are several other faint areas of fluorescein uptake on the corneal surface that are streaking in nature, consistent with floating foreign body. This likely occurred prior to the corneal foreign body being seated on the surface of the cornea. He was flushed with sterile saline. He tolerated the procedure well. He was advised of the importance and need for prompt follow-up with [facility]. No significant visible rust ring identified but he does have some haziness around where the corneal foreign body was located. He was advised that he may need further treatment to prevent any permanent damage to the cornea. FINAL IMPRESSION      1. Corneal foreign body, left, initial encounter    2.  Left corneal abrasion, initial encounter          DISPOSITION/PLAN   DISPOSITION Decision To Discharge 07/04/2022 05:56:37 AM      PATIENT REFERRED TO:  [address] [place] [address] [place] [address]  [phone]    Call today        DISCHARGE MEDICATIONS:  New Prescriptions    BACITRACIN-POLYMYXIN B, OPHTH, (AK-POLY-JUAN PABLO) OINT    Apply half inch ribbon to the left eye 3 times daily for 3 to 5 days. Controlled Substances Monitoring:     No flowsheet data found. (Please note that portions of this note were completed with a voice recognition program.  Efforts were made to edit the dictations but occasionally words are mis-transcribed. )    [address] Mi DO (electronically signed)  Attending Emergency Physician          Jake Rodríguez DO  07/04/22 6891

## 2022-07-04 NOTE — ED TRIAGE NOTES
Pt to ED with complaint of possible FB to left eye. States he felt like he got something in his eye on 7/1/22, possibly metal. States he has been flushing his eye but feels like the foreign body is shifting around. States his eyesight has sometimes been blurry. + tearing to left eye with reddened sclera.

## 2022-07-04 NOTE — ED NOTES
Black speck removed from left eye per Dr Stephanie Matthews.       Eddie Clements, RN  07/04/22 0750

## 2022-11-22 ENCOUNTER — HOSPITAL ENCOUNTER (EMERGENCY)
Age: 47
Discharge: HOME | End: 2022-11-22
Attending: EMERGENCY MEDICINE
Payer: COMMERCIAL

## 2022-11-22 VITALS
OXYGEN SATURATION: 98 % | RESPIRATION RATE: 18 BRPM | TEMPERATURE: 97 F | DIASTOLIC BLOOD PRESSURE: 110 MMHG | HEIGHT: 71 IN | BODY MASS INDEX: 30.1 KG/M2 | WEIGHT: 215 LBS | SYSTOLIC BLOOD PRESSURE: 180 MMHG | HEART RATE: 84 BPM

## 2022-11-22 DIAGNOSIS — T15.81XA FOREIGN BODY OF SCLERA OF RIGHT EYE, INITIAL ENCOUNTER: Primary | ICD-10-CM

## 2022-11-22 PROCEDURE — 99283 EMERGENCY DEPT VISIT LOW MDM: CPT

## 2022-11-22 RX ORDER — SULFACETAMIDE SODIUM 100 MG/ML
1 SOLUTION/ DROPS OPHTHALMIC 4 TIMES DAILY
Qty: 10 ML | Refills: 0 | Status: SHIPPED | OUTPATIENT
Start: 2022-11-22 | End: 2022-12-02

## 2022-11-22 RX ORDER — TETRACAINE HYDROCHLORIDE 5 MG/ML
1 SOLUTION OPHTHALMIC ONCE
Status: DISCONTINUED | OUTPATIENT
Start: 2022-11-22 | End: 2022-11-23 | Stop reason: HOSPADM

## 2022-11-22 ASSESSMENT — VISUAL ACUITY
OS: 20/40
OU: 20/40
OD: 20/40

## 2022-11-22 ASSESSMENT — PAIN SCALES - GENERAL: PAINLEVEL_OUTOF10: 4

## 2022-11-22 ASSESSMENT — PAIN DESCRIPTION - ORIENTATION: ORIENTATION: RIGHT

## 2022-11-22 ASSESSMENT — PAIN DESCRIPTION - DESCRIPTORS: DESCRIPTORS: ACHING

## 2022-11-22 ASSESSMENT — PAIN DESCRIPTION - LOCATION: LOCATION: EYE

## 2022-11-22 ASSESSMENT — PAIN - FUNCTIONAL ASSESSMENT: PAIN_FUNCTIONAL_ASSESSMENT: 0-10

## 2022-11-22 ASSESSMENT — LIFESTYLE VARIABLES
HOW OFTEN DO YOU HAVE A DRINK CONTAINING ALCOHOL: NEVER
HOW MANY STANDARD DRINKS CONTAINING ALCOHOL DO YOU HAVE ON A TYPICAL DAY: PATIENT DOES NOT DRINK

## 2022-11-22 NOTE — Clinical Note
Oleg Serrano was seen and treated in our emergency department on 11/22/2022. He may return to work on . If you have any questions or concerns, please don't hesitate to call.       Sylvia Leroy, DO

## 2022-11-23 NOTE — ED PROVIDER NOTES
[address]      Pt Name: Promise Eason  MRN: [id number]  [place] 1975  Date of evaluation: 11/22/2022  Provider: Colton Rebolledo DO    CHIEF COMPLAINT       Chief Complaint   Patient presents with    Foreign Body in Eye     Reports possible fb in right eye since yesterday/ doesn't know how it got there         HISTORY OF PRESENT ILLNESS   (Location/Symptom, Timing/Onset, Context/Setting, Quality, Duration, Modifying Factors, Severity)  Note limiting factors. Promise Eason is a 56 y.o. male who presents to the emergency department with a complaint of a foreign body sensation in the right eye. He states that at 5 PM yesterday he was using a circular saw to cut metal in the garage. He reports that there was debris all over his face and he may have gotten something in his eye. He does not wear glasses or contact lenses. He does use reading glasses. He has been rubbing his right eye without success. He was unable to identify any specific foreign body. He denies any vision loss, vision change, photophobia, headache, nausea vomiting, fever, chills, or drainage or discharge. He does report some excessive tearing from the eye. He denies any cough or cold symptoms. Nursing Notes were reviewed. HPI        REVIEW OF SYSTEMS    (2-9 systems for level 4, 10 or more for level 5)       Constitutional: Negative for fever or chills. HENT: Negative for rhinorrhea and sore throat. Eyes: Negative for redness or drainage. Respiratory: Negative for shortness of breath or dyspnea on exertion. Cardiovascular: Negative for chest pain. Gastrointestinal: Negative for abdominal pain. Negative for vomiting or diarrhea. Neurological: Negative for headache. All systems are reviewed and are negative except for those listed above in the history of present illness and ROS.         PAST MEDICAL HISTORY     Past Medical History:   Diagnosis Date Heroin use     Hypertension     Kidney stone          SURGICAL HISTORY       Past Surgical History:   Procedure Laterality Date    MANDIBLE FRACTURE SURGERY Left     WISDOM TOOTH EXTRACTION           CURRENT MEDICATIONS       Discharge Medication List as of 11/22/2022 10:33 PM        CONTINUE these medications which have NOT CHANGED    Details   Buprenorphine HCl-Naloxone HCl (SUBOXONE SL) Place under the tongueHistorical Med             ALLERGIES     Patient has no known allergies. FAMILY HISTORY     History reviewed. No pertinent family history. SOCIAL HISTORY       Social History     Socioeconomic History    Marital status: Single     Spouse name: None    Number of children: None    Years of education: None    Highest education level: None   Tobacco Use    Smoking status: Every Day     Packs/day: 1.00     Types: Cigarettes    Smokeless tobacco: Never   Vaping Use    Vaping Use: Never used   Substance and Sexual Activity    Alcohol use: Never    Drug use: Not Currently     Types: Marijuana [place])     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    Sexual activity: Yes     Partners: Female       SCREENINGS    [place] Coma Scale  Eye Opening: Spontaneous  Best Verbal Response: Oriented  Best Motor Response: Obeys commands  [place] Coma Scale Score: 15        PHYSICAL EXAM    (up to 7 for level 4, 8 or more for level 5)     ED Triage Vitals [11/22/22 2206]   BP Temp Temp Source Heart Rate Resp SpO2 Height Weight   (!) 180/110 97 °F (36.1 °C) Tympanic 84 18 98 % 5' 11\" (1.803 m) 215 lb (97.5 kg)         Physical Exam   Constitutional: Awake and alert. Very pleasant. Appears comfortable. Head: No visible evidence of trauma. Normocephalic. Eyes: Pupils equal and reactive.'s are 3 mm and reactive bilaterally. Sclera white. Conjunctive a pink. No discharge. Lash margins are clear. No photophobia. Conjunctiva normal.  No pain with extraocular movement. Lids were normal in appearance.   Please refer to the slit-lamp exam below. HENT: Oral mucosa moist.  Airway patent. Pharynx without erythema. Nares were clear. Neck:  Soft and supple. Nontender. Heart:  Regular rate and rhythm. Lungs:  No conversational dyspnea or accessory muscle use. Musculoskeletal: Extremities non-tender with full range of motion. Neurological: Alert and oriented x 3. Speech clear. No acute focal motor or sensory deficits. Skin: Skin is warm and dry. No rash. Psychiatric: Normal mood and affect. Behavior is normal.         DIAGNOSTIC RESULTS     EKG: All EKG's are interpreted by the Emergency Department Physician who either signs or Co-signs this chart in the absence of a cardiologist.        RADIOLOGY:   Non-plain film images such as CT, Ultrasound and MRI are read by the radiologist. Plain radiographic images are visualized and preliminarily interpreted by the emergency physician with the below findings:        Interpretation per the Radiologist below, if available at the time of this note:    No orders to display         ED BEDSIDE ULTRASOUND:   Performed by ED Physician - none    LABS:  Labs Reviewed - No data to display    All other labs were within normal range or not returned as of this dictation. EMERGENCY DEPARTMENT COURSE and DIFFERENTIAL DIAGNOSIS/MDM:   Vitals:    Vitals:    11/22/22 2206   BP: (!) 180/110   Pulse: 84   Resp: 18   Temp: 97 °F (36.1 °C)   TempSrc: Tympanic   SpO2: 98%   Weight: 215 lb (97.5 kg)   Height: 5' 11\" (1.803 m)         MDM        The patient presents with foreign body sensation in the right eye. Visual acuity is intact. There is no evidence of conjunctivitis. He did have some small pinpoint metallic debris in the right eye on the sclera but no evidence of corneal foreign body. These were easily removed with cotton swab. Is this patient to be included in the SEP-1 Core Measure due to severe sepsis or septic shock?    No   Exclusion criteria - the patient is NOT to be included for SEP-1 Core Measure due to: Infection is not suspected      REASSESSMENT          I advised him to follow-up with [facility] in 1 to 2 days for reexamination. He will be given a prescription for sulfacetamide drops. I advised him to not rub or scratch the eye. I advised him to wear eye protection at work to prevent eye foreign body and injury. In addition I recommended that he shower thoroughly after getting home and keep his eye closed tightly to prevent any foreign debris from getting in the eye. If his condition worsens or new symptoms develop, he was advised to return immediately to the emergency department. CRITICAL CARE TIME   Total Critical Care time was 0 minutes, excluding separately reportable procedures. There was a high probability of clinically significant/life threatening deterioration in the patient's condition which required my urgent intervention. CONSULTS:  None    PROCEDURES:  Unless otherwise noted below, none     Procedures    Right eye slit-lamp exam with foreign body removal:    The patient was given 2 drops of tetracaine ophthalmic solution 0.5% and 2 drops of fluorescein stain to the right eye. He was examined under the slit lamp. Lids were everted. There was no evidence of foreign body under the lids. There was some pinpoint metallic debris located on the external surface of both lids and face. This was removed with tape. Anterior chambers were clear. Cornea was clear. There was no evidence of any fluorescein uptake. There was a pinpoint metallic foreign body present in the sclera laterally near the lateral canthus. This was easily removed with a cotton swab. In addition there was a small cluster of pinpoint metallic debris located in the medial canthus of the eye. This was easily removed with a cotton swab. Eye was examined again no evidence of any residual debris or foreign body. FINAL IMPRESSION      1.  Foreign body of sclera of right eye, initial encounter          DISPOSITION/PLAN   DISPOSITION Decision To Discharge 11/22/2022 10:30:22 PM      PATIENT REFERRED TO:  [facility] [address]    Call today      DISCHARGE MEDICATIONS:  Discharge Medication List as of 11/22/2022 10:33 PM        START taking these medications    Details   sulfacetamide (BLEPH-10) 10 % ophthalmic solution Place 1 drop into the right eye 4 times daily for 10 days, Disp-10 mL, R-0Normal           Controlled Substances Monitoring:     No flowsheet data found. (Please note that portions of this note were completed with a voice recognition program.  Efforts were made to edit the dictations but occasionally words are mis-transcribed. )    18[address] Socrates Mi DO (electronically signed)  Attending Emergency Physician           Khalida Verdin DO  11/22/22 6354

## 2022-11-23 NOTE — DISCHARGE INSTRUCTIONS
Not rub or scratch your eye. Make sure that you wash your face well to remove any metallic dust particles on the skin. Make sure that you wear eye protection at work for prevention of foreign body. Follow-up with [address] in 1 to 2 days for reexamination. If condition worsens or new symptoms develop, return immediately to the emergency department.

## 2023-07-13 ENCOUNTER — HOSPITAL ENCOUNTER (EMERGENCY)
Age: 48
Discharge: HOME | End: 2023-07-13
Attending: EMERGENCY MEDICINE

## 2023-07-13 ENCOUNTER — APPOINTMENT (OUTPATIENT)
Dept: CT IMAGING | Age: 48
End: 2023-07-13

## 2023-07-13 ENCOUNTER — APPOINTMENT (OUTPATIENT)
Dept: GENERAL RADIOLOGY | Age: 48
End: 2023-07-13

## 2023-07-13 VITALS
DIASTOLIC BLOOD PRESSURE: 96 MMHG | HEART RATE: 75 BPM | OXYGEN SATURATION: 100 % | TEMPERATURE: 97.6 F | WEIGHT: 197.09 LBS | RESPIRATION RATE: 18 BRPM | BODY MASS INDEX: 27.59 KG/M2 | SYSTOLIC BLOOD PRESSURE: 145 MMHG | HEIGHT: 71 IN

## 2023-07-13 DIAGNOSIS — R07.89 ATYPICAL CHEST PAIN: Primary | ICD-10-CM

## 2023-07-13 DIAGNOSIS — N20.0 KIDNEY STONE: ICD-10-CM

## 2023-07-13 DIAGNOSIS — R10.11 RIGHT UPPER QUADRANT ABDOMINAL PAIN: ICD-10-CM

## 2023-07-13 LAB
ALBUMIN SERPL-MCNC: 4.4 G/DL (ref 3.4–5)
ALP SERPL-CCNC: 80 U/L (ref 40–129)
ALT SERPL-CCNC: 48 U/L (ref 10–40)
ANION GAP SERPL CALCULATED.3IONS-SCNC: 9 MMOL/L (ref 3–16)
AST SERPL-CCNC: 31 U/L (ref 15–37)
BASOPHILS # BLD: 0.1 K/UL (ref 0–0.2)
BASOPHILS NFR BLD: 0.6 %
BILIRUB DIRECT SERPL-MCNC: <0.2 MG/DL (ref 0–0.3)
BILIRUB INDIRECT SERPL-MCNC: ABNORMAL MG/DL (ref 0–1)
BILIRUB SERPL-MCNC: 0.4 MG/DL (ref 0–1)
BILIRUB UR QL STRIP.AUTO: NEGATIVE
BUN SERPL-MCNC: 17 MG/DL (ref 7–20)
CALCIUM SERPL-MCNC: 9.5 MG/DL (ref 8.3–10.6)
CHLORIDE SERPL-SCNC: 101 MMOL/L (ref 99–110)
CLARITY UR: CLEAR
CO2 SERPL-SCNC: 27 MMOL/L (ref 21–32)
COLOR UR: YELLOW
CREAT SERPL-MCNC: 1 MG/DL (ref 0.9–1.3)
D DIMER: <0.27 UG/ML FEU (ref 0–0.6)
DEPRECATED RDW RBC AUTO: 12.6 % (ref 12.4–15.4)
EOSINOPHIL # BLD: 0.2 K/UL (ref 0–0.6)
EOSINOPHIL NFR BLD: 1.9 %
EPI CELLS #/AREA URNS HPF: ABNORMAL /HPF (ref 0–5)
GFR SERPLBLD CREATININE-BSD FMLA CKD-EPI: >60 ML/MIN/{1.73_M2}
GLUCOSE SERPL-MCNC: 122 MG/DL (ref 70–99)
GLUCOSE UR STRIP.AUTO-MCNC: NEGATIVE MG/DL
HCT VFR BLD AUTO: 45 % (ref 40.5–52.5)
HGB BLD-MCNC: 15.1 G/DL (ref 13.5–17.5)
HGB UR QL STRIP.AUTO: ABNORMAL
IMM GRANULOCYTES # BLD: 0 K/UL (ref 0–0.2)
IMM GRANULOCYTES NFR BLD: 0.1 %
KETONES UR STRIP.AUTO-MCNC: NEGATIVE MG/DL
LEUKOCYTE ESTERASE UR QL STRIP.AUTO: NEGATIVE
LIPASE SERPL-CCNC: 33 U/L (ref 13–60)
LYMPHOCYTES # BLD: 1 K/UL (ref 1–5.1)
LYMPHOCYTES NFR BLD: 9.6 %
MCH RBC QN AUTO: 29.4 PG (ref 26–34)
MCHC RBC AUTO-ENTMCNC: 33.6 G/DL (ref 32–36.4)
MCV RBC AUTO: 87.7 FL (ref 80–100)
MONOCYTES # BLD: 1 K/UL (ref 0–1.3)
MONOCYTES NFR BLD: 10.1 %
NEUTROPHILS # BLD: 8 K/UL (ref 1.7–7.7)
NEUTROPHILS NFR BLD: 77.7 %
NITRITE UR QL STRIP.AUTO: NEGATIVE
PH UR STRIP.AUTO: 6 [PH] (ref 5–8)
PLATELET # BLD AUTO: 246 K/UL (ref 135–450)
PMV BLD AUTO: 9.8 FL (ref 5–10.5)
POTASSIUM SERPL-SCNC: 4.4 MMOL/L (ref 3.5–5.1)
PROT SERPL-MCNC: 7.1 G/DL (ref 6.4–8.2)
PROT UR STRIP.AUTO-MCNC: 30 MG/DL
RBC # BLD AUTO: 5.13 M/UL (ref 4.2–5.9)
RBC #/AREA URNS HPF: ABNORMAL /HPF (ref 0–4)
SODIUM SERPL-SCNC: 137 MMOL/L (ref 136–145)
SP GR UR STRIP.AUTO: >=1.03 (ref 1–1.03)
TROPONIN, HIGH SENSITIVITY: 11 NG/L (ref 0–22)
TROPONIN, HIGH SENSITIVITY: 9 NG/L (ref 0–22)
UA COMPLETE W REFLEX CULTURE PNL UR: ABNORMAL
UA DIPSTICK W REFLEX MICRO PNL UR: YES
URN SPEC COLLECT METH UR: ABNORMAL
UROBILINOGEN UR STRIP-ACNC: 0.2 E.U./DL
WBC # BLD AUTO: 10.3 K/UL (ref 4–11)
WBC #/AREA URNS HPF: ABNORMAL /HPF (ref 0–5)

## 2023-07-13 PROCEDURE — 85025 COMPLETE CBC W/AUTO DIFF WBC: CPT

## 2023-07-13 PROCEDURE — 99285 EMERGENCY DEPT VISIT HI MDM: CPT

## 2023-07-13 PROCEDURE — 80048 BASIC METABOLIC PNL TOTAL CA: CPT

## 2023-07-13 PROCEDURE — 80076 HEPATIC FUNCTION PANEL: CPT

## 2023-07-13 PROCEDURE — 93005 ELECTROCARDIOGRAM TRACING: CPT | Performed by: EMERGENCY MEDICINE

## 2023-07-13 PROCEDURE — 85379 FIBRIN DEGRADATION QUANT: CPT

## 2023-07-13 PROCEDURE — 71046 X-RAY EXAM CHEST 2 VIEWS: CPT

## 2023-07-13 PROCEDURE — 36415 COLL VENOUS BLD VENIPUNCTURE: CPT

## 2023-07-13 PROCEDURE — 74176 CT ABD & PELVIS W/O CONTRAST: CPT

## 2023-07-13 PROCEDURE — 83690 ASSAY OF LIPASE: CPT

## 2023-07-13 PROCEDURE — 81001 URINALYSIS AUTO W/SCOPE: CPT

## 2023-07-13 PROCEDURE — 84484 ASSAY OF TROPONIN QUANT: CPT

## 2023-07-13 RX ORDER — NAPROXEN SODIUM 550 MG/1
550 TABLET ORAL 2 TIMES DAILY PRN
Qty: 30 TABLET | Refills: 0 | Status: SHIPPED | OUTPATIENT
Start: 2023-07-13

## 2023-07-13 RX ORDER — TAMSULOSIN HYDROCHLORIDE 0.4 MG/1
0.4 CAPSULE ORAL DAILY
Qty: 5 CAPSULE | Refills: 0 | Status: SHIPPED | OUTPATIENT
Start: 2023-07-13 | End: 2023-07-18

## 2023-07-13 ASSESSMENT — PAIN SCALES - GENERAL
PAINLEVEL_OUTOF10: 2
PAINLEVEL_OUTOF10: 2
PAINLEVEL_OUTOF10: 0
PAINLEVEL_OUTOF10: 7

## 2023-07-13 ASSESSMENT — PAIN DESCRIPTION - ORIENTATION
ORIENTATION: RIGHT
ORIENTATION: RIGHT

## 2023-07-13 ASSESSMENT — PAIN DESCRIPTION - LOCATION
LOCATION: RIB CAGE
LOCATION: RIB CAGE

## 2023-07-13 ASSESSMENT — PAIN - FUNCTIONAL ASSESSMENT
PAIN_FUNCTIONAL_ASSESSMENT: 0-10

## 2023-07-13 ASSESSMENT — HEART SCORE
ECG: 1
ECG: 1

## 2023-07-13 NOTE — ED TRIAGE NOTES
Patient came to ER with complaints of right rib pain. Patient stated started about 1 hour ago. Pain with deep inspiration.

## 2023-07-13 NOTE — ED PROVIDER NOTES
[facility] Name: Sherrill Burgos   MRN: [id number]   [address] [facility] 1975   Date of evaluation: 7/13/2023   Provider: Mark Thompson MD   PCP: No primary care provider on file. Note Started: 4:33 PM EDT 7/13/23       CHIEF COMPLAINT  Rib Pain (injury) (Right side. No known injury.  ) and Letter for School/Work       HISTORY OF PRESENT ILLNESS  Sherrill Burgos is a 53 y.o. male who  has a past medical history of Heroin use, Hypertension, and Kidney stone. who presents to the ED complaining of chest pain and abdominal pain. Pain was on his right side. It started around hours ago. He ate lunch around 1230. It was right-sided it was pleuritic in nature and it radiated up into the middle of his chest.  He was little bit diaphoretic but then again he was working outside in the heat. He was not short of breath but it was pleuritic pain. The pain is currently gone. He is a smoker. There is a family history of heart disease. He also has a history of hypertension and is supposed to be on medication but does not take any. He has a history of IV drug use but he has not used it in 6 years and is on Suboxone. I have reviewed the following from the nursing documentation:        HISTORY :      Past Medical History:   Diagnosis Date    Heroin use     Hypertension     Kidney stone      Past Surgical History:   Procedure Laterality Date    MANDIBLE FRACTURE SURGERY Left     WISDOM TOOTH EXTRACTION       History reviewed. No pertinent family history.   Social History     Socioeconomic History    Marital status: Single     Spouse name: Not on file    Number of children: Not on file    Years of education: Not on file    Highest education level: Not on file   Occupational History    Not on file   Tobacco Use    Smoking status: Every Day     Packs/day: 0.50     Types: Cigarettes    Smokeless tobacco: Never   Vaping Use    Vaping Use: Never used   Substance and Sexual Activity    Alcohol

## 2023-07-14 LAB
EKG ATRIAL RATE: 82 BPM
EKG DIAGNOSIS: NORMAL
EKG P AXIS: 69 DEGREES
EKG P-R INTERVAL: 152 MS
EKG Q-T INTERVAL: 402 MS
EKG QRS DURATION: 84 MS
EKG QTC CALCULATION (BAZETT): 469 MS
EKG R AXIS: 55 DEGREES
EKG T AXIS: 56 DEGREES
EKG VENTRICULAR RATE: 82 BPM

## 2023-07-14 PROCEDURE — 93010 ELECTROCARDIOGRAM REPORT: CPT | Performed by: INTERNAL MEDICINE

## 2023-07-14 NOTE — ED NOTES
Initial contact with patient upon discharge. Discharge instructions reviewed with patient and verbalized understanding, denies further questions and successful teach back occurred. Offered wheelchair for discharge and declined. Discharged ambulatory with steady gait to ED lobby. Written discharge instructions, referral for PCP and urology and urine strainer provided to patient. Prescriptions x2 sent electronically to [facility] pharmacy in [place].       Pranav Jarquin RN  07/13/23 5011

## 2023-11-01 ENCOUNTER — HOSPITAL ENCOUNTER (EMERGENCY)
Age: 48
Discharge: HOME | End: 2023-11-01
Attending: EMERGENCY MEDICINE

## 2023-11-01 VITALS
HEART RATE: 92 BPM | TEMPERATURE: 96.8 F | BODY MASS INDEX: 29.4 KG/M2 | SYSTOLIC BLOOD PRESSURE: 149 MMHG | OXYGEN SATURATION: 97 % | HEIGHT: 71 IN | RESPIRATION RATE: 14 BRPM | DIASTOLIC BLOOD PRESSURE: 94 MMHG | WEIGHT: 210 LBS

## 2023-11-01 DIAGNOSIS — T15.11XA ACUTE FOREIGN BODY OF CONJUNCTIVA, RIGHT, INITIAL ENCOUNTER: Primary | ICD-10-CM

## 2023-11-01 PROCEDURE — 6370000000 HC RX 637 (ALT 250 FOR IP): Performed by: EMERGENCY MEDICINE

## 2023-11-01 PROCEDURE — 99283 EMERGENCY DEPT VISIT LOW MDM: CPT

## 2023-11-01 PROCEDURE — 2500000003 HC RX 250 WO HCPCS: Performed by: EMERGENCY MEDICINE

## 2023-11-01 RX ORDER — BALANCED SALT SOLUTION 6.4; .75; .48; .3; 3.9; 1.7 MG/ML; MG/ML; MG/ML; MG/ML; MG/ML; MG/ML
10 SOLUTION OPHTHALMIC ONCE
Status: COMPLETED | OUTPATIENT
Start: 2023-11-01 | End: 2023-11-01

## 2023-11-01 RX ORDER — TETRACAINE HYDROCHLORIDE 5 MG/ML
2 SOLUTION OPHTHALMIC ONCE
Status: COMPLETED | OUTPATIENT
Start: 2023-11-01 | End: 2023-11-01

## 2023-11-01 RX ADMIN — FLUORESCEIN SODIUM 1 MG: 1 STRIP OPHTHALMIC at 22:21

## 2023-11-01 RX ADMIN — TETRACAINE HYDROCHLORIDE 2 DROP: 5 SOLUTION OPHTHALMIC at 22:21

## 2023-11-01 RX ADMIN — BALANCED SALT SOLUTION 200 DROP: 6.4; .75; .48; .3; 3.9; 1.7 SOLUTION OPHTHALMIC at 23:00

## 2023-11-01 ASSESSMENT — PAIN - FUNCTIONAL ASSESSMENT
PAIN_FUNCTIONAL_ASSESSMENT: 0-10
PAIN_FUNCTIONAL_ASSESSMENT: PREVENTS OR INTERFERES SOME ACTIVE ACTIVITIES AND ADLS

## 2023-11-01 ASSESSMENT — PAIN DESCRIPTION - FREQUENCY: FREQUENCY: CONTINUOUS

## 2023-11-01 ASSESSMENT — PATIENT HEALTH QUESTIONNAIRE - PHQ9
SUM OF ALL RESPONSES TO PHQ QUESTIONS 1-9: 0
SUM OF ALL RESPONSES TO PHQ9 QUESTIONS 1 & 2: 0
2. FEELING DOWN, DEPRESSED OR HOPELESS: 0
SUM OF ALL RESPONSES TO PHQ QUESTIONS 1-9: 0
1. LITTLE INTEREST OR PLEASURE IN DOING THINGS: 0

## 2023-11-01 ASSESSMENT — PAIN DESCRIPTION - PAIN TYPE: TYPE: ACUTE PAIN

## 2023-11-01 ASSESSMENT — PAIN DESCRIPTION - ORIENTATION
ORIENTATION: RIGHT
ORIENTATION: RIGHT

## 2023-11-01 ASSESSMENT — PAIN DESCRIPTION - DIRECTION: RADIATING_TOWARDS: NON RADIATING

## 2023-11-01 ASSESSMENT — PAIN DESCRIPTION - LOCATION
LOCATION: EYE
LOCATION: EYE

## 2023-11-01 ASSESSMENT — PAIN DESCRIPTION - DESCRIPTORS: DESCRIPTORS: TENDER

## 2023-11-01 ASSESSMENT — PAIN SCALES - GENERAL
PAINLEVEL_OUTOF10: 3
PAINLEVEL_OUTOF10: 3

## 2023-11-01 ASSESSMENT — PAIN DESCRIPTION - ONSET: ONSET: SUDDEN

## 2023-11-01 ASSESSMENT — LIFESTYLE VARIABLES
HOW MANY STANDARD DRINKS CONTAINING ALCOHOL DO YOU HAVE ON A TYPICAL DAY: PATIENT DOES NOT DRINK
HOW OFTEN DO YOU HAVE A DRINK CONTAINING ALCOHOL: NEVER

## 2023-11-02 NOTE — ED TRIAGE NOTES
Patient ambulatory to Room 9 with c/o a piece of metal in his right eye. States he was using a  around 1700 and the metal went into his right eye under his safety glasses. Reports he thought he got the metal out, but pain and redness continue. He is awake, alert, oriented, respirations easy & regular, skin w/d, cap refill brisk. Dr. Adriano Mendieta in room to examine patient.

## 2023-11-02 NOTE — ED PROVIDER NOTES
staining/corneal abrasion. His pupils are equal round reactive. His extraocular movements are intact. There is no periorbital injury. Disposition Considerations (tests considered but not done, Admit vs D/C, Shared Decision Making, Pt Expectation of Test or Tx.): His eye was irrigated with saline. He was discharged with referral to [facility] if he has continued discomfort in 2 days. I am the Primary Clinician of Record. PROCEDURES:  None    FINAL IMPRESSION      1.  Acute foreign body of conjunctiva, right, initial encounter          DISPOSITION/PLAN   DISPOSITION Decision To Discharge 11/01/2023 10:29:02 PM      PATIENT REFERRED TO:  [facility] [facility]    Schedule an appointment as soon as possible for a visit in 2 days  If continued discomfort      DISCHARGE MEDICATIONS:  New Prescriptions    No medications on file       (Please note that portions of this note were completed with a voice recognition program.  Efforts were made to edit the dictations but occasionally words are mis-transcribed.)    Sandra Garcia MD  Attending Emergency Physician        Sandra Garcia MD  11/01/23 8775

## 2023-11-02 NOTE — ED NOTES
Eyes irrigated with balanced salts. Patient continues to state it feels like he still has something in his right eye. Dr. Tony Reid notified and in to re examine patient. No further foreign bodies seen by Dr. Tony Reid.       Faustina Cardenas RN  11/01/23 0610

## 2023-11-02 NOTE — ED NOTES
Discharge instructions reviewed with patient and verbalized understanding, denies further questions and successful teach back occurred. Offered wheelchair for discharge and declined. Discharged ambulatory with steady gait to ED lobby. Written discharge instructions and referral for [facility] provided to patient.       Prashanth Sams., QUIN  11/01/23 9731

## 2024-01-25 ENCOUNTER — HOSPITAL ENCOUNTER (EMERGENCY)
Age: 49
Discharge: HOME | End: 2024-01-25
Payer: COMMERCIAL

## 2024-01-25 VITALS
WEIGHT: 191.58 LBS | DIASTOLIC BLOOD PRESSURE: 87 MMHG | TEMPERATURE: 98 F | OXYGEN SATURATION: 99 % | BODY MASS INDEX: 26.82 KG/M2 | RESPIRATION RATE: 17 BRPM | HEIGHT: 71 IN | HEART RATE: 87 BPM | SYSTOLIC BLOOD PRESSURE: 128 MMHG

## 2024-01-25 DIAGNOSIS — R20.2 PARESTHESIAS: ICD-10-CM

## 2024-01-25 DIAGNOSIS — M79.632 PAIN OF LEFT FOREARM: ICD-10-CM

## 2024-01-25 DIAGNOSIS — I82.612 SUPERFICIAL VENOUS THROMBOSIS OF LEFT UPPER EXTREMITY: Primary | ICD-10-CM

## 2024-01-25 PROCEDURE — 93971 EXTREMITY STUDY: CPT

## 2024-01-25 PROCEDURE — 99284 EMERGENCY DEPT VISIT MOD MDM: CPT

## 2024-01-25 RX ORDER — CLINDAMYCIN HYDROCHLORIDE 300 MG/1
300 CAPSULE ORAL 3 TIMES DAILY
Qty: 30 CAPSULE | Refills: 0 | Status: SHIPPED | OUTPATIENT
Start: 2024-01-25 | End: 2024-02-04

## 2024-01-25 ASSESSMENT — PAIN - FUNCTIONAL ASSESSMENT: PAIN_FUNCTIONAL_ASSESSMENT: 0-10

## 2024-01-25 ASSESSMENT — PAIN DESCRIPTION - PAIN TYPE: TYPE: ACUTE PAIN

## 2024-01-25 ASSESSMENT — PAIN SCALES - GENERAL
PAINLEVEL_OUTOF10: 3
PAINLEVEL_OUTOF10: 2

## 2024-01-25 ASSESSMENT — PAIN DESCRIPTION - LOCATION
LOCATION: ARM
LOCATION: ARM

## 2024-01-25 ASSESSMENT — PAIN DESCRIPTION - ORIENTATION
ORIENTATION: LEFT
ORIENTATION: LEFT

## 2024-01-25 NOTE — ED PROVIDER NOTES
[facility] EMERGENCY DEPARTMENT  EMERGENCY DEPARTMENT ENCOUNTER        Pt Name: Shahid Lopez  MRN: [id number]  Birthdate 1975  Date of evaluation: 1/25/2024  Provider: Thea Coronado PA-C  PCP: No primary care provider on file.  Note Started: 12:40 PM EST 1/25/24      CHILO. I have evaluated this patient.        CHIEF COMPLAINT       Chief Complaint   Patient presents with    Numbness     Numbness in left lower arm after using IV drugs 2 days ago.  Pt believes he might have hit a nerve with the needle.       HISTORY OF PRESENT ILLNESS: 1 or more Elements             Shahid Lopez is a 48 y.o. male who presents to the ED with complaint of left forearm pain, numbness over the past couple of days after using IV drugs.  States that he injected just above the injection states he started to develop numbness and discomfort.  He now is having some edema.  He is concerned that he may have injured the arm with the needle.  States that he does use clean needles that he buys off of [employer].  He injects IV meth.  Denies this happening in the past.  He does have difficulty opening his left hand completely as this causes discomfort.  He states that he is trying to get help with IV meth, but does not not need any resources today.  States that he has some at home.    Nursing Notes were all reviewed and agreed with or any disagreements were addressed in the HPI.    REVIEW OF SYSTEMS :      Review of Systems   All other systems reviewed and are negative.      Positives and Pertinent negatives as per HPI.     SURGICAL HISTORY     Past Surgical History:   Procedure Laterality Date    MANDIBLE FRACTURE SURGERY Left     WISDOM TOOTH EXTRACTION         CURRENTMEDICATIONS       Discharge Medication List as of 1/25/2024 12:07 PM        CONTINUE these medications which have NOT CHANGED    Details   Buprenorphine HCl-Naloxone HCl (SUBOXONE SL) Place under the tongueHistorical Med             ALLERGIES     Patient

## 2024-01-25 NOTE — ED TRIAGE NOTES
Pt into ER from home with c/c Numbness in left lower arm after using IV drugs 2 days ago.  Pt believes he might have hit a nerve with the needle.

## 2024-04-24 ENCOUNTER — APPOINTMENT (OUTPATIENT)
Dept: GENERAL RADIOLOGY | Age: 49
End: 2024-04-24
Payer: COMMERCIAL

## 2024-04-24 ENCOUNTER — HOSPITAL ENCOUNTER (EMERGENCY)
Age: 49
Discharge: HOME | End: 2024-04-24
Attending: EMERGENCY MEDICINE
Payer: COMMERCIAL

## 2024-04-24 VITALS
RESPIRATION RATE: 20 BRPM | OXYGEN SATURATION: 99 % | HEIGHT: 71 IN | SYSTOLIC BLOOD PRESSURE: 168 MMHG | TEMPERATURE: 97 F | WEIGHT: 198.41 LBS | DIASTOLIC BLOOD PRESSURE: 112 MMHG | HEART RATE: 95 BPM | BODY MASS INDEX: 27.78 KG/M2
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
## 2024-04-24 DIAGNOSIS — S61.011A LACERATION OF RIGHT THUMB WITHOUT FOREIGN BODY WITHOUT DAMAGE TO NAIL, INITIAL ENCOUNTER: ICD-10-CM

## 2024-04-24 PROCEDURE — 99283 EMERGENCY DEPT VISIT LOW MDM: CPT

## 2024-04-24 PROCEDURE — 73130 X-RAY EXAM OF HAND: CPT

## 2024-04-24 PROCEDURE — 6370000000 HC RX 637 (ALT 250 FOR IP): Performed by: EMERGENCY MEDICINE

## 2024-04-24 PROCEDURE — 12001 RPR S/N/AX/GEN/TRNK 2.5CM/<: CPT

## 2024-04-24 RX ORDER — CEPHALEXIN 500 MG/1
500 CAPSULE ORAL 4 TIMES DAILY
Qty: 28 CAPSULE | Refills: 0 | Status: SHIPPED | OUTPATIENT
Start: 2024-04-24 | End: 2024-05-01

## 2024-04-24 RX ORDER — CEPHALEXIN 500 MG/1
500 CAPSULE ORAL ONCE
Status: COMPLETED | OUTPATIENT
Start: 2024-04-24 | End: 2024-04-24

## 2024-04-24 RX ORDER — BACITRACIN ZINC AND POLYMYXIN B SULFATE 500; 1000 [USP'U]/G; [USP'U]/G
OINTMENT TOPICAL
Qty: 28 G | Refills: 1 | Status: SHIPPED | OUTPATIENT
Start: 2024-04-24 | End: 2024-05-01

## 2024-04-24 RX ADMIN — CEPHALEXIN 500 MG: 500 CAPSULE ORAL at 19:56

## 2024-04-24 ASSESSMENT — PAIN DESCRIPTION - ORIENTATION: ORIENTATION: RIGHT

## 2024-04-24 ASSESSMENT — PAIN SCALES - GENERAL
PAINLEVEL_OUTOF10: 6
PAINLEVEL_OUTOF10: 5

## 2024-04-24 ASSESSMENT — PAIN DESCRIPTION - DESCRIPTORS: DESCRIPTORS: DISCOMFORT

## 2024-04-24 ASSESSMENT — PAIN DESCRIPTION - LOCATION: LOCATION: HAND

## 2024-04-24 ASSESSMENT — PAIN - FUNCTIONAL ASSESSMENT
PAIN_FUNCTIONAL_ASSESSMENT: 0-10
PAIN_FUNCTIONAL_ASSESSMENT: 0-10

## 2024-04-24 NOTE — ED PROVIDER NOTES
[facility]  eMERGENCY dEPARTMENT eNCOUnter      Pt Name: Shahid Lopez  MRN: [id number]  Birthdate 1975  Date of evaluation: 4/24/2024  Provider: Horacio Moreira MD  PCP: No primary care provider on file.      CHIEF COMPLAINT       Chief Complaint   Patient presents with    Animal Bite     Bit by mother dog in right hand / no active bleeding       HISTORY OFPRESENT ILLNESS   (Location/Symptom, Timing/Onset, Context/Setting, Quality, Duration, Modifying Factors,Severity)  Note limiting factors.     Shahid Lopez is a 48 y.o. male bitten by new family dog multiple times on the right hand complaining most of all of pain in the right thumb.  Patient is not on any blood thinners    Nursing Notes were all reviewed and agreed with or any disagreements were addressed  in the HPI.    REVIEW OF SYSTEMS    (2-9 systems for level 4, 10 or more for level 5)     Review of Systems    Positives and Pertinent negatives as per HPI.  Except as noted above in the ROS, all other systems were reviewed andnegative.       PASTMEDICAL HISTORY     Past Medical History:   Diagnosis Date    Heroin use     Hypertension     Kidney stone          SURGICAL HISTORY       Past Surgical History:   Procedure Laterality Date    MANDIBLE FRACTURE SURGERY Left     WISDOM TOOTH EXTRACTION           CURRENT MEDICATIONS       Discharge Medication List as of 4/24/2024  7:53 PM        CONTINUE these medications which have NOT CHANGED    Details   Buprenorphine HCl-Naloxone HCl (SUBOXONE SL) Place under the tongueHistorical Med             ALLERGIES     Patient has no known allergies.    FAMILY HISTORY     History reviewed. No pertinent family history.       SOCIAL HISTORY       Social History     Socioeconomic History    Marital status: Single     Spouse name: None    Number of children: None    Years of education: None    Highest education level: None   Tobacco Use    Smoking status: Former     Current

## 2024-04-24 NOTE — DISCHARGE INSTRUCTIONS
Discharge home  Keflex  Finger splint  Work excuse  Tylenol or Motrin over-the-counter for pain  Follow-up in 2 days for wound check with Dr. Urena  Sutures out in 7 days

## 2024-04-24 NOTE — ED NOTES
Cleansed right hand w hibiclens and saline. Tolerated well. Xray of right hand done @ bedside per Helga rad tech.

## 2024-04-25 ENCOUNTER — TELEPHONE (OUTPATIENT)
Dept: ORTHOPEDIC SURGERY | Age: 49
End: 2024-04-25

## 2024-04-25 NOTE — TELEPHONE ENCOUNTER
Left a vm for the patient for earlier appointment.  If spot is still open can be scheduled with CHRISTIAN at [facility] at 1 on Monday 4/29.

## 2024-04-25 NOTE — ED NOTES
Sutures intact skin well apprx. Pso applied w adaptic and kerlex drsg/ Metal finger splint to thumb on right hand/ reviewed wound care and follow up instructions w pt.

## 2024-04-25 NOTE — TELEPHONE ENCOUNTER
Appointment Request     Patient requesting earlier appointment: Yes  Appointment offered to patient: NO, 4/24/2024 Dog bite, Laceration of right thumb without foreign body without damage to nail    Patient Contact Number:[phone]

## 2025-04-11 ENCOUNTER — APPOINTMENT (OUTPATIENT)
Dept: GENERAL RADIOLOGY | Age: 50
End: 2025-04-11
Payer: COMMERCIAL

## 2025-04-11 ENCOUNTER — HOSPITAL ENCOUNTER (EMERGENCY)
Age: 50
Discharge: HOME | End: 2025-04-11
Attending: EMERGENCY MEDICINE
Payer: COMMERCIAL

## 2025-04-11 VITALS
SYSTOLIC BLOOD PRESSURE: 153 MMHG | TEMPERATURE: 97.9 F | HEART RATE: 78 BPM | DIASTOLIC BLOOD PRESSURE: 93 MMHG | BODY MASS INDEX: 30.06 KG/M2 | RESPIRATION RATE: 19 BRPM | WEIGHT: 210 LBS | OXYGEN SATURATION: 99 % | HEIGHT: 70 IN

## 2025-04-11 DIAGNOSIS — M25.511 ACUTE PAIN OF RIGHT SHOULDER: Primary | ICD-10-CM

## 2025-04-11 PROCEDURE — 99283 EMERGENCY DEPT VISIT LOW MDM: CPT

## 2025-04-11 PROCEDURE — 73030 X-RAY EXAM OF SHOULDER: CPT

## 2025-04-11 ASSESSMENT — PAIN DESCRIPTION - FREQUENCY: FREQUENCY: CONTINUOUS

## 2025-04-11 ASSESSMENT — PAIN DESCRIPTION - ORIENTATION: ORIENTATION: RIGHT

## 2025-04-11 ASSESSMENT — PAIN DESCRIPTION - LOCATION: LOCATION: SHOULDER

## 2025-04-11 ASSESSMENT — PAIN - FUNCTIONAL ASSESSMENT: PAIN_FUNCTIONAL_ASSESSMENT: 0-10

## 2025-04-11 ASSESSMENT — PAIN DESCRIPTION - DESCRIPTORS: DESCRIPTORS: DISCOMFORT

## 2025-04-11 ASSESSMENT — PAIN DESCRIPTION - PAIN TYPE: TYPE: ACUTE PAIN

## 2025-04-11 ASSESSMENT — PAIN SCALES - GENERAL: PAINLEVEL_OUTOF10: 7

## 2025-04-11 NOTE — ED PROVIDER NOTES
Types: Marijuana (Weed), Methamphetamines (Crystal Meth)     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    Sexual activity: Yes     Partners: Female   Other Topics Concern    Not on file   Social History Narrative    Not on file     Social Drivers of Health     Financial Resource Strain: Not on file   Food Insecurity: Not on file   Transportation Needs: Not on file   Physical Activity: Not on file   Stress: Not on file   Social Connections: Not on file   Intimate Partner Violence: Not on file   Housing Stability: Not on file     No current facility-administered medications for this encounter.     Current Outpatient Medications   Medication Sig Dispense Refill    Buprenorphine HCl-Naloxone HCl (SUBOXONE SL) Place under the tongue (Patient not taking: Reported on 4/11/2025)       No Known Allergies    PHYSICAL EXAM  BP (!) 153/93   Pulse 78   Temp 97.9 °F (36.6 °C) (Oral)   Resp 19   Ht 1.778 m (5' 10\")   Wt 95.3 kg (210 lb)   SpO2 99%   BMI 30.13 kg/m²    GENERAL APPEARANCE: Awake and alert. Cooperative.  No acute distress.  HENT: Normocephalic. Atraumatic. Mucous membranes are moist.  No drooling or stridor.  NECK: Supple.    EYES: PERRL. EOM's grossly intact.  HEART/CHEST: RRR. No murmurs.    LUNGS: Respirations unlabored. CTAB. Good air exchange. Speaking comfortably in full sentences.   ABDOMEN: No tenderness. Soft. Non-distended. No masses. No organomegaly. No guarding or rebound.   MUSCULOSKELETAL: No extremity edema. Compartments soft.  No deformity.  Patient with tenderness and discomfort with range of motion of the right shoulder especially anteriorly when he is abducting as well as internally rotating especially.  He is able to fully externally rotate and does have full range of motion intact although with discomfort especially with those 2 movements.  The left shoulder has full range of motion.  No overlying erythema, warmth or rash.  SKIN: Warm and dry. No acute rashes.   NEUROLOGICAL:  chart was created using Dragon dictation software.  Efforts were made by me to ensure accuracy, however some errors may be present due to limitations of this technology and occasionally words are not transcribed correctly.        Gayatri Palacio MD  04/11/25 2200

## 2025-06-18 ENCOUNTER — HOSPITAL ENCOUNTER (EMERGENCY)
Age: 50
Discharge: HOME | End: 2025-06-18
Attending: EMERGENCY MEDICINE
Payer: COMMERCIAL

## 2025-06-18 VITALS
DIASTOLIC BLOOD PRESSURE: 89 MMHG | OXYGEN SATURATION: 98 % | WEIGHT: 209.88 LBS | BODY MASS INDEX: 30.05 KG/M2 | TEMPERATURE: 97.5 F | HEART RATE: 54 BPM | HEIGHT: 70 IN | SYSTOLIC BLOOD PRESSURE: 161 MMHG | RESPIRATION RATE: 16 BRPM

## 2025-06-18 DIAGNOSIS — R11.0 NAUSEA: ICD-10-CM

## 2025-06-18 DIAGNOSIS — R50.9 SUBJECTIVE FEVER: ICD-10-CM

## 2025-06-18 DIAGNOSIS — R61 DIAPHORESIS: Primary | ICD-10-CM

## 2025-06-18 DIAGNOSIS — R79.89 ELEVATED LFTS: ICD-10-CM

## 2025-06-18 LAB
ALBUMIN SERPL-MCNC: 4.7 G/DL (ref 3.4–5)
ALBUMIN/GLOB SERPL: 1.4 {RATIO} (ref 1.1–2.2)
ALP SERPL-CCNC: 83 U/L (ref 40–129)
ALT SERPL-CCNC: 141 U/L (ref 10–40)
ANION GAP SERPL CALCULATED.3IONS-SCNC: 13 MMOL/L (ref 3–16)
AST SERPL-CCNC: 63 U/L (ref 15–37)
BASOPHILS # BLD: 0 K/UL (ref 0–0.2)
BASOPHILS NFR BLD: 0.7 %
BILIRUB SERPL-MCNC: 0.5 MG/DL (ref 0–1)
BILIRUB UR QL STRIP.AUTO: NEGATIVE
BUN SERPL-MCNC: 14 MG/DL (ref 7–20)
CALCIUM SERPL-MCNC: 9.8 MG/DL (ref 8.3–10.6)
CHARACTER UR: ABNORMAL
CHLORIDE SERPL-SCNC: 105 MMOL/L (ref 99–110)
CLARITY UR: CLEAR
CO2 SERPL-SCNC: 23 MMOL/L (ref 21–32)
COLOR UR: YELLOW
CREAT SERPL-MCNC: 0.9 MG/DL (ref 0.9–1.3)
DEPRECATED RDW RBC AUTO: 13.3 % (ref 12.4–15.4)
EOSINOPHIL # BLD: 0.1 K/UL (ref 0–0.6)
EOSINOPHIL NFR BLD: 1.7 %
EPI CELLS #/AREA URNS HPF: ABNORMAL /HPF (ref 0–5)
FLUAV RNA UPPER RESP QL NAA+PROBE: NEGATIVE
FLUBV AG NPH QL: NEGATIVE
GFR SERPLBLD CREATININE-BSD FMLA CKD-EPI: >90 ML/MIN/{1.73_M2}
GLUCOSE SERPL-MCNC: 100 MG/DL (ref 70–99)
GLUCOSE UR STRIP.AUTO-MCNC: NEGATIVE MG/DL
HCT VFR BLD AUTO: 46 % (ref 40.5–52.5)
HGB BLD-MCNC: 15.5 G/DL (ref 13.5–17.5)
HGB UR QL STRIP.AUTO: NEGATIVE
IMM GRANULOCYTES # BLD: 0 K/UL (ref 0–0.2)
IMM GRANULOCYTES NFR BLD: 0 %
KETONES UR STRIP.AUTO-MCNC: NEGATIVE MG/DL
LEUKOCYTE ESTERASE UR QL STRIP.AUTO: NEGATIVE
LYMPHOCYTES # BLD: 1 K/UL (ref 1–5.1)
LYMPHOCYTES NFR BLD: 17.8 %
MCH RBC QN AUTO: 29.2 PG (ref 26–34)
MCHC RBC AUTO-ENTMCNC: 33.7 G/DL (ref 32–36.4)
MCV RBC AUTO: 86.6 FL (ref 80–100)
MONOCYTES # BLD: 0.4 K/UL (ref 0–1.3)
MONOCYTES NFR BLD: 6.3 %
MUCOUS THREADS #/AREA URNS LPF: ABNORMAL /LPF
NEUTROPHILS # BLD: 4.2 K/UL (ref 1.7–7.7)
NEUTROPHILS NFR BLD: 73.5 %
NITRITE UR QL STRIP.AUTO: NEGATIVE
PH UR STRIP.AUTO: 5.5 [PH] (ref 5–8)
PLATELET # BLD AUTO: 224 K/UL (ref 135–450)
PMV BLD AUTO: 10.4 FL (ref 5–10.5)
POTASSIUM SERPL-SCNC: 3.8 MMOL/L (ref 3.5–5.1)
PROT SERPL-MCNC: 8.1 G/DL (ref 6.4–8.2)
PROT UR STRIP.AUTO-MCNC: 30 MG/DL
RBC # BLD AUTO: 5.31 M/UL (ref 4.2–5.9)
RBC #/AREA URNS HPF: ABNORMAL /HPF (ref 0–4)
SARS-COV-2 RDRP RESP QL NAA+PROBE: NOT DETECTED
SODIUM SERPL-SCNC: 141 MMOL/L (ref 136–145)
SP GR UR STRIP.AUTO: >=1.03 (ref 1–1.03)
UA COMPLETE W REFLEX CULTURE PNL UR: ABNORMAL
UA DIPSTICK W REFLEX MICRO PNL UR: YES
URN SPEC COLLECT METH UR: ABNORMAL
UROBILINOGEN UR STRIP-ACNC: 0.2 E.U./DL
WBC # BLD AUTO: 5.7 K/UL (ref 4–11)
WBC #/AREA URNS HPF: ABNORMAL /HPF (ref 0–5)

## 2025-06-18 PROCEDURE — 6370000000 HC RX 637 (ALT 250 FOR IP): Performed by: EMERGENCY MEDICINE

## 2025-06-18 PROCEDURE — 36415 COLL VENOUS BLD VENIPUNCTURE: CPT

## 2025-06-18 PROCEDURE — 85025 COMPLETE CBC W/AUTO DIFF WBC: CPT

## 2025-06-18 PROCEDURE — 87635 SARS-COV-2 COVID-19 AMP PRB: CPT

## 2025-06-18 PROCEDURE — 81001 URINALYSIS AUTO W/SCOPE: CPT

## 2025-06-18 PROCEDURE — 99283 EMERGENCY DEPT VISIT LOW MDM: CPT

## 2025-06-18 PROCEDURE — 80053 COMPREHEN METABOLIC PANEL: CPT

## 2025-06-18 PROCEDURE — 87804 INFLUENZA ASSAY W/OPTIC: CPT

## 2025-06-18 RX ORDER — ONDANSETRON 4 MG/1
4 TABLET, FILM COATED ORAL EVERY 8 HOURS PRN
Qty: 15 TABLET | Refills: 0 | Status: SHIPPED | OUTPATIENT
Start: 2025-06-18 | End: 2025-06-28

## 2025-06-18 RX ORDER — ONDANSETRON 4 MG/1
4 TABLET, ORALLY DISINTEGRATING ORAL ONCE
Status: COMPLETED | OUTPATIENT
Start: 2025-06-18 | End: 2025-06-18

## 2025-06-18 RX ADMIN — ONDANSETRON 4 MG: 4 TABLET, ORALLY DISINTEGRATING ORAL at 20:44

## 2025-06-18 ASSESSMENT — PAIN - FUNCTIONAL ASSESSMENT: PAIN_FUNCTIONAL_ASSESSMENT: NONE - DENIES PAIN

## 2025-06-19 NOTE — ED NOTES
at bedside to collect covid and flu swabs.  Procedure is reviewed with patient, reminded again of collection technique.  Patient tolerates collect of both poorly, reports \"get it out, get it out,\" during collection of each swab.

## 2025-06-19 NOTE — ED NOTES
Discharge instructions reviewed with patient.  All questions answered to his satisfaction.  He verbalizes understanding.  Patient ambulates from ED, gait is even and steady, all belongings in hand, in no apparent distress at this time.

## 2025-06-19 NOTE — ED TRIAGE NOTES
Patient to ED w/ c/o feeling cold, gets the chills then \"dumps sweat,\" all symptoms started this morning.  He denies any n/v/d, denies pain.  He states he did not take anything for symptoms.

## 2025-06-19 NOTE — DISCHARGE INSTRUCTIONS
Monitor for fever. Tylenol and/or motrin for fever as needed. Fluid. Return for persistent high fever, trouble breathing, abdominal pain, persistent vomiting or other worsening symptoms. You may just have a virus starting. Your liver function tests were elevated, you have had this in the past.  Followup with a PCP or GI specialist since you have tested positive for hepatitis C in past.

## 2025-06-19 NOTE — ED PROVIDER NOTES
98%.    Prescriptions given:   New Prescriptions    ONDANSETRON (ZOFRAN) 4 MG TABLET    Take 1 tablet by mouth every 8 hours as needed for Nausea or Vomiting         This chart was created using dragon voice recognition software.        Muriel Chaves MD  06/18/25 2376     26-Mar-2025 16:43

## 2025-06-19 NOTE — ED NOTES
Patient reports improvement in nausea since receiving PO zofran.  Orders for labs received, collected via straight stick per ED protocol.  Patient tolerates well.  He is updated with POC.  He denies further needs at this time.  Will continue to monitor.